# Patient Record
Sex: FEMALE | Race: WHITE | HISPANIC OR LATINO | Employment: PART TIME | URBAN - METROPOLITAN AREA
[De-identification: names, ages, dates, MRNs, and addresses within clinical notes are randomized per-mention and may not be internally consistent; named-entity substitution may affect disease eponyms.]

---

## 2022-07-26 ENCOUNTER — APPOINTMENT (OUTPATIENT)
Dept: OBGYN | Facility: CLINIC | Age: 22
End: 2022-07-26
Payer: MEDICAID

## 2022-07-26 ENCOUNTER — GYNECOLOGY VISIT (OUTPATIENT)
Dept: OBGYN | Facility: CLINIC | Age: 22
End: 2022-07-26
Payer: MEDICAID

## 2022-07-26 VITALS — WEIGHT: 142.9 LBS

## 2022-07-26 DIAGNOSIS — N93.8 DUB (DYSFUNCTIONAL UTERINE BLEEDING): ICD-10-CM

## 2022-07-26 PROCEDURE — 76830 TRANSVAGINAL US NON-OB: CPT | Performed by: OBSTETRICS & GYNECOLOGY

## 2022-07-26 PROCEDURE — 99203 OFFICE O/P NEW LOW 30 MIN: CPT | Mod: 25 | Performed by: OBSTETRICS & GYNECOLOGY

## 2022-07-26 RX ORDER — ONDANSETRON 4 MG/1
4 TABLET, ORALLY DISINTEGRATING ORAL EVERY 4 HOURS PRN
Qty: 20 TABLET | Refills: 0 | Status: SHIPPED | OUTPATIENT
Start: 2022-07-26 | End: 2022-08-31

## 2022-07-26 NOTE — PROGRESS NOTES
Cc: Confirmation of pregnancy    HPI:  The patient is a 21 y.o.  here for confirmation of pregnancy exam.   Patient states that she is nauseous and vomiting at least once per day  She is relatively uncertain about her last period being either May or .    She does state that she gets a menses every month    Fetal movement denies   Vaginal bleeding denies    Leakage of fluid denies    Cramping reports mild  Nausea/vomiting: reports   HA  denies   Dysuria  denies     Review of systems:  Pertinent positives documented in HPI and all other systems reviewed & are negative    Gyn History:   Menarche: 11yo  LMP: ? Or May  Cycles every month,.   Sexually active with male partner, Lifetime partners 4  Birth control history: condoms.  STD hx: denies   Last pap smear: none.    Pregnancy related complications:    OB History    Para Term  AB Living   1             SAB IAB Ectopic Molar Multiple Live Births                    # Outcome Date GA Lbr Femi/2nd Weight Sex Delivery Anes PTL Lv   1 Current              History reviewed. No pertinent past medical history.  History reviewed. No pertinent surgical history.  Social History     Socioeconomic History   • Marital status: Single     Spouse name: Not on file   • Number of children: Not on file   • Years of education: Not on file   • Highest education level: Not on file   Occupational History   • Not on file   Tobacco Use   • Smoking status: Never Smoker   • Smokeless tobacco: Never Used   Vaping Use   • Vaping Use: Never used   Substance and Sexual Activity   • Alcohol use: Not Currently   • Drug use: Never   • Sexual activity: Yes     Partners: Male   Other Topics Concern   • Not on file   Social History Narrative   • Not on file     Social Determinants of Health     Financial Resource Strain: Not on file   Food Insecurity: Not on file   Transportation Needs: Not on file   Physical Activity: Not on file   Stress: Not on file   Social Connections: Not on  file   Intimate Partner Violence: Not on file   Housing Stability: Not on file     Family History   Problem Relation Age of Onset   • No Known Problems Mother    • No Known Problems Father      Allergies:   Allergies as of 2022   • (No Known Allergies)       PE:    Wt 64.8 kg (142 lb 14.4 oz)     General:appears stated age, is in no apparent distress  Head: normocephalic, non-tender  Neck: neck is supple, no jugular venous distension  Abdomen: Bowel sounds positive, nondistended, soft, nontender x4, no rebound or guarding. No organomegaly. No masses.  Female GYN: normal female external genitalia without lesions     Skin: No rashes, or ulcers or lesions seen  Psychiatric: Patient shows appropriate affect, is alert and oriented x3, intact judgment and insight.    transvaginal scan and per my read:    Indication: missed menses, positive pregnancy test.   LMP unsure    Findings: hsu intrauterine pregnancy @6.21, 6.05, 6.4 cm with an average of 6.22 cm.  Average gestational age 12 weeks 4 days.  Positive yolk sac. Positive fetal cardiac activity @165 BPM. Right ovary no masses. Left Ovary small simple cyst. Cervical length 4.03 cm on transvaginal scanning.   Trace free fluid in the cul-de-sac.    Impression: viable IUP @12 weeks 4 days weeks. EDC by US of 2/3/2023    DATIN/3/2023 by today's ultrasound as LMP is unknown as per ACOG guidelines.    A/P:   1. DUB (dysfunctional uterine bleeding)  POCT Pregnancy       # Newly diagnosed pregnancy  Advised on small frequent meals for the prevention of nausea during pregnancy.  Advised that unpasteurized cheeses, raw seafood, and NSAIDs are not recommended during pregnancy.  SAB precautions discussed  Increase water intake and encouraged healthy nutrition.  Begin prenatal vitamins.  Zofran sent to the pharmacy for nausea vomiting    Spent 30 minutes in face-to-face patient contact in which greater than 50% of that visit was spent in counseling/coordination of  care of newly diagnosed pregnancy including medical and surgical options of care.    F/u in 4 weeks for new OB visit

## 2022-07-26 NOTE — PROGRESS NOTES
Patient here for GYN/DUB.  UPT= positive   LMP= unsure   SAM= unsure  GA= unsure   Last pap due for one. Patient has never had one  Phone number: [phone]  Pharmacy verified  C/o patient states vomiting, back pain, headache and some cramping.

## 2022-07-27 ENCOUNTER — OFFICE VISIT (OUTPATIENT)
Dept: OBGYN | Facility: CLINIC | Age: 22
End: 2022-07-27
Payer: MEDICAID

## 2022-08-29 PROBLEM — Z34.00 PREGNANCY, SUPERVISION OF FIRST: Status: ACTIVE | Noted: 2022-08-29

## 2022-08-31 ENCOUNTER — HOSPITAL ENCOUNTER (OUTPATIENT)
Facility: MEDICAL CENTER | Age: 22
End: 2022-08-31
Attending: NURSE PRACTITIONER
Payer: COMMERCIAL

## 2022-08-31 ENCOUNTER — INITIAL PRENATAL (OUTPATIENT)
Dept: OBGYN | Facility: CLINIC | Age: 22
End: 2022-08-31
Payer: COMMERCIAL

## 2022-08-31 VITALS
DIASTOLIC BLOOD PRESSURE: 60 MMHG | SYSTOLIC BLOOD PRESSURE: 112 MMHG | BODY MASS INDEX: 25.76 KG/M2 | HEIGHT: 62 IN | WEIGHT: 140 LBS

## 2022-08-31 DIAGNOSIS — Z34.00 ENCOUNTER FOR SUPERVISION PREGNANCY IN PRIMIGRAVIDA, ANTEPARTUM: ICD-10-CM

## 2022-08-31 PROCEDURE — 88175 CYTOPATH C/V AUTO FLUID REDO: CPT

## 2022-08-31 PROCEDURE — 87591 N.GONORRHOEAE DNA AMP PROB: CPT

## 2022-08-31 PROCEDURE — 0500F INITIAL PRENATAL CARE VISIT: CPT | Performed by: NURSE PRACTITIONER

## 2022-08-31 PROCEDURE — 87491 CHLMYD TRACH DNA AMP PROBE: CPT

## 2022-08-31 ASSESSMENT — EDINBURGH POSTNATAL DEPRESSION SCALE (EPDS)
THINGS HAVE BEEN GETTING ON TOP OF ME: YES, SOMETIMES I HAVEN'T BEEN COPING AS WELL AS USUAL
I HAVE LOOKED FORWARD WITH ENJOYMENT TO THINGS: AS MUCH AS I EVER DID
I HAVE BEEN ANXIOUS OR WORRIED FOR NO GOOD REASON: NO, NOT AT ALL
I HAVE BEEN ABLE TO LAUGH AND SEE THE FUNNY SIDE OF THINGS: AS MUCH AS I ALWAYS COULD
THE THOUGHT OF HARMING MYSELF HAS OCCURRED TO ME: NEVER
TOTAL SCORE: 3
I HAVE BEEN SO UNHAPPY THAT I HAVE BEEN CRYING: NO, NEVER
I HAVE FELT SCARED OR PANICKY FOR NO GOOD REASON: NO, NOT AT ALL
I HAVE FELT SAD OR MISERABLE: NO, NOT AT ALL
I HAVE BLAMED MYSELF UNNECESSARILY WHEN THINGS WENT WRONG: NO, NEVER
I HAVE BEEN SO UNHAPPY THAT I HAVE HAD DIFFICULTY SLEEPING: NOT VERY OFTEN

## 2022-08-31 NOTE — PROGRESS NOTES
Subjective:   Miriam Reyes Lopez is a 22 y.o.  who presents for her new OB exam.  She is 17w5d with an SAM of Estimated Date of Delivery: 2/3/23 by US due to unknown LMP. She is feeling well and has no concerns at this time. Denies VB, LOF, contractions or pain. No ER visits or previous care in this pregnancy. Denies dysuria, vaginal DC, fever. Reports  fetal movement. Desires AFP.  Declines CF.  Desires NIPT testing.     History reviewed. No pertinent past medical history.    Psych Hx: Patient denies any history of depression, anxiety, PTSD, bipolar or any other psychological issues.     History reviewed. No pertinent surgical history.     OB History    Para Term  AB Living   1             SAB IAB Ectopic Molar Multiple Live Births                    # Outcome Date GA Lbr Femi/2nd Weight Sex Delivery Anes PTL Lv   1 Current                 Gynecological Hx: Denies any hx of STIs, including HSV. Denies any vulvovaginal disorders and no hx of abnormal cervical cytology. Last pap n/a.     Sexual Hx: No current partner, who is not involved FOB     Contraceptive Hx: Has not used in the past and has since discontinued use.     Family History   Problem Relation Age of Onset    No Known Problems Mother     No Known Problems Father     No Known Problems Sister     No Known Problems Brother     No Known Problems Brother      Denies any genetic disorders in family history.     Social History     Socioeconomic History    Marital status: Single     Spouse name: Not on file    Number of children: Not on file    Years of education: Not on file    Highest education level: Not on file   Occupational History    Not on file   Tobacco Use    Smoking status: Never    Smokeless tobacco: Never   Vaping Use    Vaping Use: Never used   Substance and Sexual Activity    Alcohol use: Not Currently     Comment: occa    Drug use: Never    Sexual activity: Not Currently     Partners: Male     Birth control/protection: None  "  Other Topics Concern    Not on file   Social History Narrative    Not on file     Social Determinants of Health     Financial Resource Strain: Not on file   Food Insecurity: Not on file   Transportation Needs: Not on file   Physical Activity: Not on file   Stress: Not on file   Social Connections: Not on file   Intimate Partner Violence: Not on file   Housing Stability: Not on file       FOB is not involved and does not live with Miriam Reyes Lopez. She lives with her siblings.  Pregnancy is unplanned but desired.    She is currently not working, denies any heavy lifting or exposure to potential teratogens like environmental or occupational toxins.   Denies alcohol use, drug use, or tobacco use in pregnancy.   Denies any current or hx of sexual, emotional or physical abuse or trauma.     Current Medications: PNV, n/a  Allergies: Denies allergies to medications, food, or environmental allergies    Objective:      Vitals:    08/31/22 1421   BP: 112/60   Weight: 63.5 kg (140 lb)   Height: 1.575 m (5' 2\")        See Prenatal Physical and Prenatal Vitals    Prenatal Physical:  General Exam:  HEENT: normal    Heart: normal    Thyroid: normal    Lungs: normal    Lymph Nodes: normal    Breasts: normal    Neurological: normal    Abdomen: normal    Skin: normal    Extremities: normal    Pelvic Exam:  Vulva:  Normal  Vagina:  Normal  Cervix:  Inflammation  Uterus (wks):  17  Adnexa:  Normal       Assessment:      1.  IUP @ 17w5d per US      2.  S=D      3.  See problem list as follows       Patient Active Problem List    Diagnosis Date Noted    Supervision of first pregnancy  08/29/2022         Plan:   - GC/CT & pap done today  - NIPT and AFP ordered   - Prenatal labs ordered - lab slip given  - Discussed PNV, nutrition, adequate water intake, and exercise/weight gain in pregnancy  - NOB informational packet with anticipatory guidance given  - Information on Centering Pregnancy given, n/a  - S/sx of pregnancy warning signs " and PTL precautions given  - Complete OB US in 3 wks  - Return to [facility] in 4 wks

## 2022-08-31 NOTE — PROGRESS NOTES
Pt here for New OB today  LMP: unknown   Phone: [phone]  Pharmacy verified  Pt is taking PNV  Pt c/o mild N/V, no medication required   Declines genetic testing

## 2022-09-01 ENCOUNTER — HOSPITAL ENCOUNTER (OUTPATIENT)
Dept: LAB | Facility: MEDICAL CENTER | Age: 22
End: 2022-09-01
Attending: NURSE PRACTITIONER
Payer: COMMERCIAL

## 2022-09-01 DIAGNOSIS — Z34.00 ENCOUNTER FOR SUPERVISION PREGNANCY IN PRIMIGRAVIDA, ANTEPARTUM: ICD-10-CM

## 2022-09-01 PROCEDURE — 36415 COLL VENOUS BLD VENIPUNCTURE: CPT

## 2022-09-01 PROCEDURE — 99001 SPECIMEN HANDLING PT-LAB: CPT

## 2022-09-05 LAB
C TRACH DNA GENITAL QL NAA+PROBE: POSITIVE
CYTOLOGY REG CYTOL: ABNORMAL
N GONORRHOEA DNA GENITAL QL NAA+PROBE: NEGATIVE
SPECIMEN SOURCE: ABNORMAL

## 2022-09-06 DIAGNOSIS — O98.812 CHLAMYDIA INFECTION AFFECTING PREGNANCY IN SECOND TRIMESTER: ICD-10-CM

## 2022-09-06 DIAGNOSIS — A74.9 CHLAMYDIA INFECTION AFFECTING PREGNANCY IN SECOND TRIMESTER: ICD-10-CM

## 2022-09-06 PROBLEM — O98.819 CHLAMYDIA INFECTION AFFECTING PREGNANCY: Status: ACTIVE | Noted: 2022-09-06

## 2022-09-06 RX ORDER — AZITHROMYCIN 500 MG/1
500 TABLET, FILM COATED ORAL ONCE
Qty: 2 TABLET | Refills: 0 | Status: SHIPPED | OUTPATIENT
Start: 2022-09-06 | End: 2022-09-06

## 2022-09-07 ENCOUNTER — TELEPHONE (OUTPATIENT)
Dept: OBGYN | Facility: CLINIC | Age: 22
End: 2022-09-07
Payer: COMMERCIAL

## 2022-09-07 NOTE — TELEPHONE ENCOUNTER
----- Message from HOLLEY Ledesma sent at 2022  7:15 PM PDT -----  Pt has chlamydia. Rx sent in, can send in partner treatment and all partners need treatment from the past three months. No sex for one week after treatment and we will retest her so please treva date of treatment. Also check and see if she has BV symptoms.     Pt notified regarding positive chlamydia and need to  Rx from her pharmacy, explained to pt she has to take the 2 tablets at once and is only one time dose.  Advised pt make a note of the date she took medication because she needs to be retested 4 wks after she had taken meds. Advised for her partner or any partners in the past 3 months need to be treated with his PCP or [facility] or provider can send a Rx for current partner. Pt states she does want provider to send Rx for current partner but pt does not know his . Pt to call this RN back tomorrow to provide information. Pt verbalized understanding.   [facility] form and lab results faxed to [facility].     In regards about BV, pt states she does have vaginal pain, yellow/greenish d/c and foul odor. Explained provider will be notify tomorrow and Rx will be send to her pharmacy. Pt verbalized understanding.     22  0824 pt called back and provided her partner name Emre Triplett  1992. Pt states beside the symptoms mentioned yesterday she also has itching. Consulted with Krystal ALBRIGHT and advised for pt to finish antibiotics first for chlamydia and BV; which will send Rx for, and if pt continue to have itching, pt to use OTC Monistat 7 x7 nights. Pt informed as above and explained BV is not an STI. Pt informed she needs to start antibiotics. Should take the full Tx and advised to take meds with food but not with milk and to avoid alcohol and intercourse while on Tx. Pharmacy verified with pt. Advised pt to take Chlamydia Tx first today and than tomorrow to start Tx for BV. Pt agreed and verbalized  understanding.

## 2022-09-08 DIAGNOSIS — B96.89 BACTERIAL VAGINOSIS: ICD-10-CM

## 2022-09-08 DIAGNOSIS — N76.0 BACTERIAL VAGINOSIS: ICD-10-CM

## 2022-09-08 RX ORDER — METRONIDAZOLE 500 MG/1
500 TABLET ORAL 3 TIMES DAILY
Qty: 14 TABLET | Refills: 0 | Status: SHIPPED | OUTPATIENT
Start: 2022-09-08 | End: 2022-09-14

## 2022-09-08 NOTE — TELEPHONE ENCOUNTER
EPT called in for Emre Triplett [birth year] for chlamydia to [facility] [address].

## 2022-09-09 ENCOUNTER — TELEPHONE (OUTPATIENT)
Dept: OBGYN | Facility: CLINIC | Age: 22
End: 2022-09-09
Payer: COMMERCIAL

## 2022-09-09 NOTE — TELEPHONE ENCOUNTER
Pt called stating she went to her pharmacy yesterday to  her azithromycin Rx and she was told they didn't have it. Called [facility] pharmacy on [address] and pharmacy tech stated they do have it for pt but they haveit under the wrong  of 1998, tech stated pt need to give them this  and then fix it. Pt notified, pt agreed and verbalized understanding.    Pt states she was able to get her Flygyl and instruction states to take 1 tab 3 times a day. Consulted with Krystal ALBRIGHT and there was a typo and pt should only take 1 tab 2 times a day for 7 days. Pt notified and verbalized understanding.

## 2022-09-12 ENCOUNTER — GYNECOLOGY VISIT (OUTPATIENT)
Dept: OBGYN | Facility: CLINIC | Age: 22
End: 2022-09-12
Payer: COMMERCIAL

## 2022-09-12 ENCOUNTER — HOSPITAL ENCOUNTER (OUTPATIENT)
Facility: MEDICAL CENTER | Age: 22
End: 2022-09-12
Attending: OBSTETRICS & GYNECOLOGY
Payer: COMMERCIAL

## 2022-09-12 ENCOUNTER — TELEPHONE (OUTPATIENT)
Dept: OBGYN | Facility: CLINIC | Age: 22
End: 2022-09-12
Payer: COMMERCIAL

## 2022-09-12 VITALS
BODY MASS INDEX: 25.34 KG/M2 | DIASTOLIC BLOOD PRESSURE: 72 MMHG | SYSTOLIC BLOOD PRESSURE: 122 MMHG | WEIGHT: 143 LBS | HEIGHT: 63 IN

## 2022-09-12 DIAGNOSIS — R30.0 DYSURIA: ICD-10-CM

## 2022-09-12 DIAGNOSIS — B37.31 VULVOVAGINITIS DUE TO YEAST: ICD-10-CM

## 2022-09-12 LAB
APPEARANCE UR: CLEAR
BILIRUB UR STRIP-MCNC: NORMAL MG/DL
COLOR UR AUTO: YELLOW
GLUCOSE UR STRIP.AUTO-MCNC: NORMAL MG/DL
KETONES UR STRIP.AUTO-MCNC: NORMAL MG/DL
LEUKOCYTE ESTERASE UR QL STRIP.AUTO: NORMAL
NITRITE UR QL STRIP.AUTO: NORMAL
PH UR STRIP.AUTO: 6 [PH] (ref 5–8)
PROT UR QL STRIP: NORMAL MG/DL
RBC UR QL AUTO: NORMAL
SP GR UR STRIP.AUTO: 1.02
UROBILINOGEN UR STRIP-MCNC: NORMAL MG/DL

## 2022-09-12 PROCEDURE — 99213 OFFICE O/P EST LOW 20 MIN: CPT | Performed by: OBSTETRICS & GYNECOLOGY

## 2022-09-12 PROCEDURE — 87480 CANDIDA DNA DIR PROBE: CPT

## 2022-09-12 PROCEDURE — 81002 URINALYSIS NONAUTO W/O SCOPE: CPT | Performed by: OBSTETRICS & GYNECOLOGY

## 2022-09-12 PROCEDURE — 87510 GARDNER VAG DNA DIR PROBE: CPT

## 2022-09-12 PROCEDURE — 87660 TRICHOMONAS VAGIN DIR PROBE: CPT

## 2022-09-12 ASSESSMENT — ENCOUNTER SYMPTOMS
EYES NEGATIVE: 1
CONSTITUTIONAL NEGATIVE: 1
RESPIRATORY NEGATIVE: 1
CARDIOVASCULAR NEGATIVE: 1
GASTROINTESTINAL NEGATIVE: 1

## 2022-09-12 NOTE — TELEPHONE ENCOUNTER
09/12/22  2:02 PM  Patient called to inform us that she is having vaginal burning She took the azithromycin. She took two days of the flagyl. Patient complains of chills. Scheduled for visit today with Dr. estrada

## 2022-09-12 NOTE — PROGRESS NOTES
"Subjective     Miriam Reyes Lopez is a 22 y.o. female who presents with Gynecologic Exam (Vaginal pain, Dysuria)  Pt recently was treated for chlamydia and BV. She notes burning when urinating. Also feels \"open\" in vag area.              Review of Systems   Constitutional: Negative.    HENT: Negative.     Eyes: Negative.    Respiratory: Negative.     Cardiovascular: Negative.    Gastrointestinal: Negative.    Genitourinary:  Positive for dysuria.   Skin: Negative.             Objective     /72 (BP Location: Right arm)   Ht 1.6 m (5' 3\")   Wt 64.9 kg (143 lb)   LMP  (LMP Unknown)   BMI 25.33 kg/m²      Physical Exam  Constitutional:       Appearance: Normal appearance.   Abdominal:      General: There is distension.      Palpations: Abdomen is soft.      Comments: Gravid to just above umbilicus= no distention, just gravid   Genitourinary:     General: Normal vulva.      Comments: Vulva and labial majora nml however pink, red irritation noted medial to labia minora consistant with atypical yeast.  Speculum shows cvx closed, slight clear dischage, sample taken since recent BV and c/o external burning with voiding                          Assessment & Plan        1. Dysuria  Neg UA  - POCT Urinalysis    2. Vulvovaginitis due to yeast  Rx sent  - terconazole (TERAZOL 3) 0.8 % vaginal cream; Use sparingly externally bid as directed in area affected for up to 1 week if needed  Dispense: 30 g; Refill: 0  - VAGINAL PATHOGENS DNA PANEL  3) incidental preg- keep already scheduled appts, no complaints. Prec given                "

## 2022-09-13 LAB — AMBIGUOUS DTTM AMBI4: NORMAL

## 2022-09-14 ENCOUNTER — TELEPHONE (OUTPATIENT)
Dept: OBGYN | Facility: CLINIC | Age: 22
End: 2022-09-14
Payer: COMMERCIAL

## 2022-09-14 LAB
CANDIDA DNA VAG QL PROBE+SIG AMP: NEGATIVE
G VAGINALIS DNA VAG QL PROBE+SIG AMP: POSITIVE
T VAGINALIS DNA VAG QL PROBE+SIG AMP: NEGATIVE

## 2022-09-14 NOTE — TELEPHONE ENCOUNTER
Pt called stating yesterday she was seen provider gave her a Rx for a cream for yeast and her partner is having itching and bad odor. Pt asking if we can also send him treatment for this as well. Consulted with Dr. Fatima who saw pt yesterday and advised for partner to OTC Monistat cream and apply it to affected area, if pt's partner symptoms do not improve , partner needs to be seen by his provider. Pt informed as above and agreed and verbalized understanding.

## 2022-09-21 ENCOUNTER — APPOINTMENT (OUTPATIENT)
Dept: RADIOLOGY | Facility: IMAGING CENTER | Age: 22
End: 2022-09-21
Attending: NURSE PRACTITIONER
Payer: COMMERCIAL

## 2022-09-21 ENCOUNTER — TELEPHONE (OUTPATIENT)
Dept: OBGYN | Facility: CLINIC | Age: 22
End: 2022-09-21

## 2022-09-21 DIAGNOSIS — Z34.00 ENCOUNTER FOR SUPERVISION PREGNANCY IN PRIMIGRAVIDA, ANTEPARTUM: ICD-10-CM

## 2022-09-21 PROCEDURE — 76805 OB US >/= 14 WKS SNGL FETUS: CPT | Mod: TC | Performed by: RADIOLOGY

## 2022-09-22 NOTE — TELEPHONE ENCOUNTER
Pt called stating she had her US today and the provider who did her US did not speak a lot of Welsh and she was not told her baby's gender, pt asking for NIPT results as well. Explained to pt the reason we do the US is to identify anatomical abnormalities and if they are able to see the baby's gender the radiologist will note on the report. Provider reviewed US results today. Explained to pt on US results it states likely male. NIPT results obtained from [facility] portal and send to provider to review and base on those results baby is male. Pt informed. Pt asking if she will have another US. Explained to base on US results, US is normal and US will only be repeated if medical indicated. Pt verbalized understanding.

## 2022-09-23 ENCOUNTER — TELEPHONE (OUTPATIENT)
Dept: OBGYN | Facility: CLINIC | Age: 22
End: 2022-09-23
Payer: COMMERCIAL

## 2022-09-23 RX ORDER — METRONIDAZOLE 500 MG/1
500 TABLET ORAL 2 TIMES DAILY
Qty: 14 TABLET | Refills: 0 | Status: SHIPPED | OUTPATIENT
Start: 2022-09-23 | End: 2022-09-30

## 2022-09-23 NOTE — TELEPHONE ENCOUNTER
Midwife Pat Upton reviewed vaginal pathogens results and recommends treatment with Flagyl, Rx sent. Spoke with pt and informed her of +BV and RX sent, instructions given. Pt understood and had no questions or concerns

## 2022-09-28 ENCOUNTER — HOSPITAL ENCOUNTER (OUTPATIENT)
Facility: MEDICAL CENTER | Age: 22
End: 2022-09-28
Attending: PHYSICIAN ASSISTANT
Payer: COMMERCIAL

## 2022-09-28 ENCOUNTER — ROUTINE PRENATAL (OUTPATIENT)
Dept: OBGYN | Facility: CLINIC | Age: 22
End: 2022-09-28
Payer: COMMERCIAL

## 2022-09-28 VITALS — DIASTOLIC BLOOD PRESSURE: 66 MMHG | WEIGHT: 143 LBS | SYSTOLIC BLOOD PRESSURE: 116 MMHG | BODY MASS INDEX: 25.33 KG/M2

## 2022-09-28 DIAGNOSIS — Z34.02 ENCOUNTER FOR PRENATAL CARE IN SECOND TRIMESTER OF FIRST PREGNANCY: ICD-10-CM

## 2022-09-28 PROCEDURE — 87591 N.GONORRHOEAE DNA AMP PROB: CPT

## 2022-09-28 PROCEDURE — 90471 IMMUNIZATION ADMIN: CPT | Performed by: PHYSICIAN ASSISTANT

## 2022-09-28 PROCEDURE — 90040 PR PRENATAL FOLLOW UP: CPT | Performed by: PHYSICIAN ASSISTANT

## 2022-09-28 PROCEDURE — 87491 CHLMYD TRACH DNA AMP PROBE: CPT

## 2022-09-28 PROCEDURE — 90686 IIV4 VACC NO PRSV 0.5 ML IM: CPT | Performed by: PHYSICIAN ASSISTANT

## 2022-09-28 NOTE — PROGRESS NOTES
Pt has no complaints with cramping, bleeding or pain. No discernable FM yet but +FM noted on Doppler and pt acknowledges she feels the movement then. Pt has not done labs yet - will do PNL, MSAFP this week, as she was unaware. US wnl - pt notified of results and NIPT results. 1hr GTT next visit. Pt also will take Flagyl this week, so improving with vag irritation. Flu vaccine given today. RTC 4 wk or sooner prn.

## 2022-09-28 NOTE — PROGRESS NOTES
Pt. Here for OB/FU. Reports Good FM.   Pt states no complaints.   Pt states was not aware needed to PNP and AFP lab slips reprinted.   Pt states took medication for + Chlamydia on 9/8/2022 and FOB took also on same day. PREETHI today   Flu vaccine given today right deltoid

## 2022-09-29 LAB
C TRACH DNA GENITAL QL NAA+PROBE: NEGATIVE
N GONORRHOEA DNA GENITAL QL NAA+PROBE: NEGATIVE
SPECIMEN SOURCE: NORMAL

## 2022-10-04 ENCOUNTER — HOSPITAL ENCOUNTER (OUTPATIENT)
Dept: LAB | Facility: MEDICAL CENTER | Age: 22
End: 2022-10-04
Attending: NURSE PRACTITIONER
Payer: COMMERCIAL

## 2022-10-04 DIAGNOSIS — Z34.00 ENCOUNTER FOR SUPERVISION PREGNANCY IN PRIMIGRAVIDA, ANTEPARTUM: ICD-10-CM

## 2022-10-04 PROBLEM — O99.019 ANEMIA AFFECTING PREGNANCY: Status: ACTIVE | Noted: 2022-10-04

## 2022-10-04 LAB
ABO GROUP BLD: NORMAL
AMORPH CRY #/AREA URNS HPF: PRESENT /HPF
ANISOCYTOSIS BLD QL SMEAR: ABNORMAL
APPEARANCE UR: ABNORMAL
BACTERIA #/AREA URNS HPF: ABNORMAL /HPF
BASOPHILS # BLD AUTO: 0.8 % (ref 0–1.8)
BASOPHILS # BLD: 0.07 K/UL (ref 0–0.12)
BILIRUB UR QL STRIP.AUTO: NEGATIVE
BLD GP AB SCN SERPL QL: NORMAL
BURR CELLS BLD QL SMEAR: NORMAL
COLOR UR: YELLOW
EOSINOPHIL # BLD AUTO: 0.3 K/UL (ref 0–0.51)
EOSINOPHIL NFR BLD: 3.5 % (ref 0–6.9)
EPI CELLS #/AREA URNS HPF: ABNORMAL /HPF
ERYTHROCYTE [DISTWIDTH] IN BLOOD BY AUTOMATED COUNT: 42 FL (ref 35.9–50)
GLUCOSE UR STRIP.AUTO-MCNC: NEGATIVE MG/DL
HBV SURFACE AG SER QL: ABNORMAL
HCT VFR BLD AUTO: 31.9 % (ref 37–47)
HCV AB SER QL: ABNORMAL
HGB BLD-MCNC: 9.7 G/DL (ref 12–16)
HIV 1+2 AB+HIV1 P24 AG SERPL QL IA: NORMAL
HYALINE CASTS #/AREA URNS LPF: ABNORMAL /LPF
KETONES UR STRIP.AUTO-MCNC: NEGATIVE MG/DL
LEUKOCYTE ESTERASE UR QL STRIP.AUTO: NEGATIVE
LYMPHOCYTES # BLD AUTO: 1.26 K/UL (ref 1–4.8)
LYMPHOCYTES NFR BLD: 14.8 % (ref 22–41)
MANUAL DIFF BLD: NORMAL
MCH RBC QN AUTO: 22.4 PG (ref 27–33)
MCHC RBC AUTO-ENTMCNC: 30.4 G/DL (ref 33.6–35)
MCV RBC AUTO: 73.7 FL (ref 81.4–97.8)
MICRO URNS: ABNORMAL
MICROCYTES BLD QL SMEAR: ABNORMAL
MONOCYTES # BLD AUTO: 0.3 K/UL (ref 0–0.85)
MONOCYTES NFR BLD AUTO: 3.5 % (ref 0–13.4)
MORPHOLOGY BLD-IMP: NORMAL
NEUTROPHILS # BLD AUTO: 6.58 K/UL (ref 2–7.15)
NEUTROPHILS NFR BLD: 77.4 % (ref 44–72)
NITRITE UR QL STRIP.AUTO: NEGATIVE
NRBC # BLD AUTO: 0 K/UL
NRBC BLD-RTO: 0 /100 WBC
OVALOCYTES BLD QL SMEAR: NORMAL
PH UR STRIP.AUTO: 7.5 [PH] (ref 5–8)
PLATELET # BLD AUTO: 361 K/UL (ref 164–446)
PLATELET BLD QL SMEAR: NORMAL
PMV BLD AUTO: 9.4 FL (ref 9–12.9)
POIKILOCYTOSIS BLD QL SMEAR: NORMAL
PROT UR QL STRIP: NEGATIVE MG/DL
RBC # BLD AUTO: 4.33 M/UL (ref 4.2–5.4)
RBC # URNS HPF: ABNORMAL /HPF
RBC BLD AUTO: PRESENT
RBC UR QL AUTO: NEGATIVE
RH BLD: NORMAL
RUBV AB SER QL: 92.3 IU/ML
SP GR UR STRIP.AUTO: 1.02
T PALLIDUM AB SER QL IA: ABNORMAL
UROBILINOGEN UR STRIP.AUTO-MCNC: 0.2 MG/DL
WBC # BLD AUTO: 8.5 K/UL (ref 4.8–10.8)
WBC #/AREA URNS HPF: ABNORMAL /HPF

## 2022-10-04 PROCEDURE — 86850 RBC ANTIBODY SCREEN: CPT

## 2022-10-04 PROCEDURE — 87389 HIV-1 AG W/HIV-1&-2 AB AG IA: CPT

## 2022-10-04 PROCEDURE — 85025 COMPLETE CBC W/AUTO DIFF WBC: CPT

## 2022-10-04 PROCEDURE — 86762 RUBELLA ANTIBODY: CPT

## 2022-10-04 PROCEDURE — 81001 URINALYSIS AUTO W/SCOPE: CPT

## 2022-10-04 PROCEDURE — 85007 BL SMEAR W/DIFF WBC COUNT: CPT

## 2022-10-04 PROCEDURE — 86901 BLOOD TYPING SEROLOGIC RH(D): CPT

## 2022-10-04 PROCEDURE — 82105 ALPHA-FETOPROTEIN SERUM: CPT

## 2022-10-04 PROCEDURE — 80307 DRUG TEST PRSMV CHEM ANLYZR: CPT

## 2022-10-04 PROCEDURE — 86780 TREPONEMA PALLIDUM: CPT

## 2022-10-04 PROCEDURE — 87340 HEPATITIS B SURFACE AG IA: CPT

## 2022-10-04 PROCEDURE — 86900 BLOOD TYPING SEROLOGIC ABO: CPT

## 2022-10-04 PROCEDURE — 86592 SYPHILIS TEST NON-TREP QUAL: CPT

## 2022-10-04 PROCEDURE — 36415 COLL VENOUS BLD VENIPUNCTURE: CPT

## 2022-10-04 PROCEDURE — 87086 URINE CULTURE/COLONY COUNT: CPT

## 2022-10-04 PROCEDURE — 86803 HEPATITIS C AB TEST: CPT

## 2022-10-05 ENCOUNTER — TELEPHONE (OUTPATIENT)
Dept: OBGYN | Facility: CLINIC | Age: 22
End: 2022-10-05
Payer: COMMERCIAL

## 2022-10-05 LAB
AMPHET CTO UR CFM-MCNC: NEGATIVE NG/ML
BARBITURATES CTO UR CFM-MCNC: NEGATIVE NG/ML
BENZODIAZ CTO UR CFM-MCNC: NEGATIVE NG/ML
CANNABINOIDS CTO UR CFM-MCNC: NEGATIVE NG/ML
COCAINE CTO UR CFM-MCNC: NEGATIVE NG/ML
DRUG COMMENT 753798: NORMAL
METHADONE CTO UR CFM-MCNC: NEGATIVE NG/ML
OPIATES CTO UR CFM-MCNC: NEGATIVE NG/ML
PCP CTO UR CFM-MCNC: NEGATIVE NG/ML
PROPOXYPH CTO UR CFM-MCNC: NEGATIVE NG/ML

## 2022-10-05 NOTE — TELEPHONE ENCOUNTER
----- Message from HOLLEY Ledesma sent at 10/4/2022  1:40 PM PDT -----  Let pt know to take iron 2-3 times a day and increase her iron rich foods please.     10/05/22  11:11 AM   service used, ID #[id number]  Left message for pt to call back regarding test results.     10/06/22  9:37 AM   service used, ID #[id number]  Left message for pt to call back regarding lab results.     Patient informed. Please see telephone encounter from Deanna LEON

## 2022-10-06 LAB
BACTERIA UR CULT: NORMAL
SIGNIFICANT IND 70042: NORMAL
SITE SITE: NORMAL
SOURCE SOURCE: NORMAL

## 2022-10-07 ENCOUNTER — TELEPHONE (OUTPATIENT)
Dept: OBGYN | Facility: CLINIC | Age: 22
End: 2022-10-07
Payer: COMMERCIAL

## 2022-10-07 NOTE — TELEPHONE ENCOUNTER
----- Message from HOLLEY Ledesma sent at 10/4/2022  1:40 PM PDT -----  Let pt know to take iron 2-3 times a day and increase her iron rich foods please.     3rd attempt  10/07/22  1431 Left message for pt to call back regarding lab results.   1701 Pt notified of need to start on Iron supplementation to take 325 mg 2-3 times a day. Recommended to take it with orange juice/vit c, to avoid dairy products 1hr before and 2hr after. Not to take with PNV. To increase water intake to decrease side effects of constipation. Advised to increase iron rich foods. Pt agreed and verbalized understanding.

## 2022-10-09 LAB
# FETUSES US: NORMAL
AFP MOM SERPL: 1.31
AFP SERPL-MCNC: 124 NG/ML
AGE - REPORTED: 24.5 YR
CURRENT SMOKER: NO
FAMILY MEMBER DISEASES HX: NO
GA METHOD: NORMAL
GA: NORMAL WK
IDDM PATIENT QL: NO
INTEGRATED SCN PATIENT-IMP: NORMAL
SPECIMEN DRAWN SERPL: NORMAL

## 2022-10-25 ENCOUNTER — ROUTINE PRENATAL (OUTPATIENT)
Dept: OBGYN | Facility: CLINIC | Age: 22
End: 2022-10-25
Payer: COMMERCIAL

## 2022-10-25 VITALS — DIASTOLIC BLOOD PRESSURE: 62 MMHG | WEIGHT: 143.8 LBS | SYSTOLIC BLOOD PRESSURE: 108 MMHG | BODY MASS INDEX: 25.47 KG/M2

## 2022-10-25 DIAGNOSIS — Z34.02 ENCOUNTER FOR PRENATAL CARE IN SECOND TRIMESTER OF FIRST PREGNANCY: ICD-10-CM

## 2022-10-25 PROBLEM — B96.89 BACTERIAL VAGINOSIS: Status: RESOLVED | Noted: 2022-09-08 | Resolved: 2022-10-25

## 2022-10-25 PROBLEM — O98.819 CHLAMYDIA INFECTION AFFECTING PREGNANCY: Status: RESOLVED | Noted: 2022-09-06 | Resolved: 2022-10-25

## 2022-10-25 PROBLEM — A74.9 CHLAMYDIA INFECTION AFFECTING PREGNANCY: Status: RESOLVED | Noted: 2022-09-06 | Resolved: 2022-10-25

## 2022-10-25 PROBLEM — N76.0 BACTERIAL VAGINOSIS: Status: RESOLVED | Noted: 2022-09-08 | Resolved: 2022-10-25

## 2022-10-25 PROCEDURE — 0502F SUBSEQUENT PRENATAL CARE: CPT | Performed by: PHYSICIAN ASSISTANT

## 2022-10-25 NOTE — PROGRESS NOTES
Pt. Here for OB/FU. Reports Good FM.   Pt states has feeling dizziness.  Pt given 1 HR GTT, RPR and CBC lab slip today along with instructions.

## 2022-10-25 NOTE — PROGRESS NOTES
Pt has no complaints with cramping, bleeding or pain, though pt has occ dizziness and random pains in stomach. Pt is drinking only 2 bottles water daily, so urged to incr to 8-10 daily, call if not improving. +FM but little - pt concerned. I explained pt should pay close attention to movements, though all babies are different and will give FKC next visit. PREETHI for chlamydia infection neg - pt notified. PNL, MSAFP wnl - pt notified and aware of severe anemia. Pt already taking PNV tabs and has added iron supplements. Will recheck with 1hr GTT. Pt also hasnt gained uch weight but states she is eating well, has no N/V - pt to add some calorie dense foods, though continue healthy habits as well, as fundal height c/w dating, US shows adequate growth. RTC 4 wk or sooner prn.

## 2022-11-03 ENCOUNTER — HOSPITAL ENCOUNTER (OUTPATIENT)
Dept: LAB | Facility: MEDICAL CENTER | Age: 22
End: 2022-11-03
Attending: PHYSICIAN ASSISTANT
Payer: COMMERCIAL

## 2022-11-03 DIAGNOSIS — Z34.02 ENCOUNTER FOR PRENATAL CARE IN SECOND TRIMESTER OF FIRST PREGNANCY: ICD-10-CM

## 2022-11-03 LAB
ERYTHROCYTE [DISTWIDTH] IN BLOOD BY AUTOMATED COUNT: 45 FL (ref 35.9–50)
GLUCOSE 1H P 50 G GLC PO SERPL-MCNC: 149 MG/DL (ref 70–139)
HCT VFR BLD AUTO: 32.6 % (ref 37–47)
HGB BLD-MCNC: 9.7 G/DL (ref 12–16)
MCH RBC QN AUTO: 21.8 PG (ref 27–33)
MCHC RBC AUTO-ENTMCNC: 29.8 G/DL (ref 33.6–35)
MCV RBC AUTO: 73.3 FL (ref 81.4–97.8)
PLATELET # BLD AUTO: 347 K/UL (ref 164–446)
PMV BLD AUTO: 9.7 FL (ref 9–12.9)
RBC # BLD AUTO: 4.45 M/UL (ref 4.2–5.4)
T PALLIDUM AB SER QL IA: NORMAL
WBC # BLD AUTO: 7.7 K/UL (ref 4.8–10.8)

## 2022-11-03 PROCEDURE — 85027 COMPLETE CBC AUTOMATED: CPT

## 2022-11-03 PROCEDURE — 82950 GLUCOSE TEST: CPT

## 2022-11-03 PROCEDURE — 86780 TREPONEMA PALLIDUM: CPT

## 2022-11-03 PROCEDURE — 36415 COLL VENOUS BLD VENIPUNCTURE: CPT

## 2022-11-04 ENCOUNTER — TELEPHONE (OUTPATIENT)
Dept: OBGYN | Facility: CLINIC | Age: 22
End: 2022-11-04
Payer: COMMERCIAL

## 2022-11-04 DIAGNOSIS — R73.09 ELEVATED GLUCOSE TOLERANCE TEST: ICD-10-CM

## 2022-11-05 NOTE — TELEPHONE ENCOUNTER
----- Message from BRIANDA Watts sent at 11/4/2022 11:09 AM PDT -----  Needs 3hr GTT asap please. Ordered today.     Also, just confirm pt is taking PNV caps/tabs and not gummies, or at least taking FeSO4 325mf supplements if taking gummies.       Pt notified of abnormal 1hr gtt and need to do 3hr gtt this time. Pt instructed to fast 10-12hr prior to testing. Pt informed she is only allow to drink plain water during fasting time. Advised to bring a snack for after the test is done. Pt notified will be staying in the labs for the 3hr. Pt agreed to do it Tuesday 11/8/2022. Pt verbalized understanding.    Pt states she is taking PNV gels form and also taking iron pill 2 times a day. Explained to pt I will call her back on Monday 11/7/2022 after consulting with provider. Pt agreed and verbalized understanding.     11/07/22  0405 Consulted with Daniella ALBRIGHT and advised for pt to continue taking iron pill 2 times a day and to unsure she is taking it on an empty stomach with OJ/Vit C and to increase her iron rich foods.   1515 called pt and notified as indicated by Daniella ALBRIGHT. Pt agreed and verbalized understanding.

## 2022-11-11 ENCOUNTER — HOSPITAL ENCOUNTER (OUTPATIENT)
Dept: LAB | Facility: MEDICAL CENTER | Age: 22
End: 2022-11-11
Attending: PHYSICIAN ASSISTANT
Payer: COMMERCIAL

## 2022-11-11 DIAGNOSIS — R73.09 ELEVATED GLUCOSE TOLERANCE TEST: ICD-10-CM

## 2022-11-11 LAB
GLUCOSE 1H P CHAL SERPL-MCNC: 154 MG/DL (ref 65–180)
GLUCOSE 2H P CHAL SERPL-MCNC: 137 MG/DL (ref 65–155)
GLUCOSE 3H P CHAL SERPL-MCNC: 133 MG/DL (ref 65–140)
GLUCOSE BS SERPL-MCNC: 83 MG/DL (ref 65–95)

## 2022-11-11 PROCEDURE — 36415 COLL VENOUS BLD VENIPUNCTURE: CPT

## 2022-11-11 PROCEDURE — 82952 GTT-ADDED SAMPLES: CPT

## 2022-11-11 PROCEDURE — 82951 GLUCOSE TOLERANCE TEST (GTT): CPT

## 2022-11-23 ENCOUNTER — ROUTINE PRENATAL (OUTPATIENT)
Dept: OBGYN | Facility: CLINIC | Age: 22
End: 2022-11-23
Payer: COMMERCIAL

## 2022-11-23 VITALS — BODY MASS INDEX: 26.04 KG/M2 | DIASTOLIC BLOOD PRESSURE: 60 MMHG | SYSTOLIC BLOOD PRESSURE: 100 MMHG | WEIGHT: 147 LBS

## 2022-11-23 DIAGNOSIS — Z34.03 ENCOUNTER FOR PRENATAL CARE IN THIRD TRIMESTER OF FIRST PREGNANCY: ICD-10-CM

## 2022-11-23 PROCEDURE — 90715 TDAP VACCINE 7 YRS/> IM: CPT | Performed by: PHYSICIAN ASSISTANT

## 2022-11-23 PROCEDURE — 0502F SUBSEQUENT PRENATAL CARE: CPT | Performed by: PHYSICIAN ASSISTANT

## 2022-11-23 PROCEDURE — 90471 IMMUNIZATION ADMIN: CPT | Performed by: PHYSICIAN ASSISTANT

## 2022-11-23 NOTE — PROGRESS NOTES
Pt has no complaints with cramping, UCs, VB, LOF, though pt has incr pressure and occ low back pain - stretches d/w pt today. +FM. 1hr GTT elevated, but 3hr GTT wnl and CBC stable from prior labs - pt taking daily FeSO4 supplementation and aware of other lab results. TDaP given today. PTL precautions reviewed. RTC 2 wk or sooner prn.

## 2022-11-23 NOTE — LETTER
Cuente los Movimientos de garcia Bebé  Otro paso importante para la lisa de garcia bebé    Miriam Reyes Lopez     [facility]            Dept: [phone]    ¿Cuántas semanas tiene de embarazo? 29w5d    Fecha cuando tiene que comenzar a contar el movimiento: 11/23/2022                  [place] debe usar yvette diagrama    Case manera en que garcia doctor puede controlar a lisa de garcia bebé es sabiendo cuantas veces se mueve garcia bebé en el útero, o por medio de las “pataditas”.  Usted podrá ayudarle a garcia médico al usar cada día el siguiente diagrama.    Cada día, usted debe prestar atención a cuantas horas le lleva a garcia bebé moverse 10 veces.  Comience a contar en la mañana, lo antes posible después de haberse levantado.    · Primeramente, escriba la hora en que se mueve garcia bebé, hasta llegar a 10 veces.  · Colóquele un check o palomita a cada cuadrito cada vez que garcia bebé se mueva hasta que complete 10 veces.  · Escriba la hora cuando termine de contar 10 veces en la última columna.  · Sume el total del tiempo que le llevó contar los 10 movimientos.  · Finalmente, complete el cuadrito de cuantas horas le llevó hacerlo.    Después de brianne contado los 10 movimientos, ya no tendrá que contar los demás movimientos por el tara del día.  A la mañana siguiente, comience a contar de nuevo cuantas veces se mueve el bebé desde el momento en que se levante.    ¿Qué tendría que considerarse un “movimiento”?  Es difícil de decirlo porque es distinto de case madre a otra, y de un embarazo a otro.  Lo importante es que cuente el movimiento de la misma manera orion el transcurso de garcia embarazo.  Si tiene preguntas adicionales, pregúntele a garcia doctor.    ¡Cuente cuidadosamente cada día!     MUESTRA:  Semana 28    ¿Cuántas horas le ha llevado sentir 10 movimientos?        Hora de Inicio     1     2     3     4     5     6     7     8     9     10   Hora de Finlizar   Miguel. 8:20 ·  ·  ·  ·  ·  ·  ·  ·  ·  ·  11:40    Mar.               Mié.               Jue.               Vie.               Sáb.               Dom.                 IMPORTANTE:  Usted debe contactar a garcia doctor si le lleva más de 2 horas sentir 10 movimientos de garcia bebé.    Cada mañana, escriba la hora de inicio y comience a contar los movimientos de garcia bebé.  Hágalo colocándole un check o palomita a cada cuadrito cada vez que sienta un movimiento de garcia bebé.  Cuando haya sentido 10 “pataditas”, escriba la hora en que terminó de contar en la última columna.  Luego, complete en la cajita (arriba de la hayder de check o palomita) el número total de horas que le llevó hacerlo.  Asegúrese de leer completamente las instrucciones en la página anterior.

## 2022-11-23 NOTE — PROGRESS NOTES
Pt. Here for OB/F/U, Kick Count sheet given and explained to pt.   Good FM  Pt states has been having lower pelvic pain and back pain.   Tdap today, right deltoid

## 2022-11-28 ENCOUNTER — HOSPITAL ENCOUNTER (EMERGENCY)
Facility: MEDICAL CENTER | Age: 22
End: 2022-11-28
Attending: OBSTETRICS & GYNECOLOGY | Admitting: OBSTETRICS & GYNECOLOGY
Payer: COMMERCIAL

## 2022-11-28 ENCOUNTER — TELEPHONE (OUTPATIENT)
Dept: OBGYN | Facility: CLINIC | Age: 22
End: 2022-11-28
Payer: COMMERCIAL

## 2022-11-28 VITALS
DIASTOLIC BLOOD PRESSURE: 59 MMHG | HEART RATE: 86 BPM | SYSTOLIC BLOOD PRESSURE: 122 MMHG | RESPIRATION RATE: 16 BRPM | OXYGEN SATURATION: 97 % | HEIGHT: 61 IN | TEMPERATURE: 97.9 F | BODY MASS INDEX: 27 KG/M2 | WEIGHT: 143 LBS

## 2022-11-28 DIAGNOSIS — N30.90 CYSTITIS: ICD-10-CM

## 2022-11-28 LAB
APPEARANCE UR: CLEAR
COLOR UR AUTO: ABNORMAL
GLUCOSE UR QL STRIP.AUTO: NEGATIVE MG/DL
KETONES UR QL STRIP.AUTO: NEGATIVE MG/DL
LEUKOCYTE ESTERASE UR QL STRIP.AUTO: ABNORMAL
NITRITE UR QL STRIP.AUTO: NEGATIVE
PH UR STRIP.AUTO: 7 [PH] (ref 5–8)
PROT UR QL STRIP: 30 MG/DL
RBC UR QL AUTO: NEGATIVE
SP GR UR STRIP.AUTO: >=1.03 (ref 1–1.03)

## 2022-11-28 PROCEDURE — 59025 FETAL NON-STRESS TEST: CPT

## 2022-11-28 PROCEDURE — 99282 EMERGENCY DEPT VISIT SF MDM: CPT | Performed by: STUDENT IN AN ORGANIZED HEALTH CARE EDUCATION/TRAINING PROGRAM

## 2022-11-28 PROCEDURE — 81002 URINALYSIS NONAUTO W/O SCOPE: CPT

## 2022-11-28 PROCEDURE — 87086 URINE CULTURE/COLONY COUNT: CPT

## 2022-11-28 PROCEDURE — 99284 EMERGENCY DEPT VISIT MOD MDM: CPT

## 2022-11-28 RX ORDER — NITROFURANTOIN 25; 75 MG/1; MG/1
100 CAPSULE ORAL 2 TIMES DAILY
Qty: 10 CAPSULE | Refills: 0 | Status: ON HOLD | OUTPATIENT
Start: 2022-11-28 | End: 2023-01-26

## 2022-11-28 ASSESSMENT — PAIN SCALES - GENERAL: PAINLEVEL: 3

## 2022-11-28 NOTE — ED PROVIDER NOTES
OB ED EVALUATION NOTE    SUBJECTIVE:  Miriam Reyes Lopez is a 22 y.o.,  at 30w3d who presents for abdominal pain and decreased FM. She has noted improvement in FM since arrival. Her abdominal pain has been present for days and is bilateral and radiating to the groin. She has had some nausea in this pregnancy but denies fevers, chills, emesis, or inability to tolerate PO intake. She reports mild dysuria and urinary frequency. No hematuria or flank pain.  She denies vaginal bleeding, leakage of fluid, or contractions.    I personally reviewed the past medical and surgical histories, as well as the problem list.  This pregnancy c/b anemia    Medications:  PNV  Ferrous sulfate      OBJECTIVE:  Vital Signs:   Vitals:    22 1328   BP:    Pulse: 86   Resp:    Temp:    SpO2: 97%     General: Lying in bed, alert, conversant, NAD  Resp: unlabored, symmetric chest rise  Abdomen: gravid, NTTP.  MSK: no CVA tenderness  Extremities: No deformity or edema  Neuro: No gross focal deficits    NST read: baseline 140, moderate variability, accels present, decels absent  [place]: no contractions or irritability    LABS / IMAGING:  Results for orders placed or performed during the hospital encounter of 22   POCT urinalysis device results   Result Value Ref Range    POC Color Marisa     POC Appearance Clear     POC Glucose Negative Negative mg/dL    POC Ketones Negative Negative mg/dL    POC Specific Gravity >=1.030 (A) 1.005 - 1.030    POC Blood Negative Negative    POC Urine PH 7.0 5.0 - 8.0    POC Protein 30 (A) Negative mg/dL    POC Nitrites Negative Negative    POC Leukocyte Esterase Trace (A) Negative         ASSESSMENT AND PLAN:  22 y.o.  at 30w3d presenting for decreased FM and pain c/w round ligament pain.  Reactive NST and improvement in FM since arrival  Reports dysuria and urinary frequency; UA not completely c/w cystitis but with protein and LE. Given presence of symptoms, will treat with nitrofurantoin but  will also send urine for culture.    Follow up in office as scheduled on 12/7/22.    Patient Active Problem List    Diagnosis Date Noted    Cystitis 11/28/2022    Elevated 1hr  --> 3hr GTT wnl 11/04/2022    Anemia affecting pregnancy - 9.7/31.9 on 10/4 - stable 11/4 10/04/2022    Supervision of first pregnancy  08/29/2022       The patient was counseled to call or return for vaginal bleeding, regular contractions, leakage of fluid or decreased fetal movement.    Kalie Hall M.D.

## 2022-11-28 NOTE — TELEPHONE ENCOUNTER
Pt called in stating that she has been having dysuria and shooting pain every time that she uses the restroom. Pt states that she is having very bad cramping as well, and fetal movements have become less and less. Consulted with Daniella (midwife) who states that the patient  needs to go to L&D for evaluation. Informed the patient of providers recommendation and pt agreeable to plan. No further questions.

## 2022-11-28 NOTE — PROGRESS NOTES
"1323: Pt is a  at 30.3 weeks today, presents to L&D with c/o decreased fetal movement and \"cramping\". Pt denies VB/LOF/regular painful Uc's at this time. EFM and TOCO applied x1, VS taken, pt comfortable in bed at this time.     1445: Dr Hall at bedside to assess pt, POC discussed, discharge order received, see orders for details      "

## 2022-11-30 ENCOUNTER — TELEPHONE (OUTPATIENT)
Dept: OBGYN | Facility: CLINIC | Age: 22
End: 2022-11-30
Payer: COMMERCIAL

## 2022-11-30 LAB
BACTERIA UR CULT: NORMAL
SIGNIFICANT IND 70042: NORMAL
SITE SITE: NORMAL
SOURCE SOURCE: NORMAL

## 2022-12-01 NOTE — TELEPHONE ENCOUNTER
----- Message from Kalie Hall M.D. sent at 11/30/2022  4:06 PM PST -----  Please notify patient that her urine culture was negative so she can discontinue the antibiotic. Thanks.      Called pt and notified as above. Pt agreed and verbalized understanding.

## 2022-12-07 ENCOUNTER — ROUTINE PRENATAL (OUTPATIENT)
Dept: OBGYN | Facility: CLINIC | Age: 22
End: 2022-12-07
Payer: COMMERCIAL

## 2022-12-07 VITALS
WEIGHT: 145.21 LBS | DIASTOLIC BLOOD PRESSURE: 60 MMHG | BODY MASS INDEX: 27.44 KG/M2 | SYSTOLIC BLOOD PRESSURE: 100 MMHG

## 2022-12-07 DIAGNOSIS — Z34.03 ENCOUNTER FOR PRENATAL CARE IN THIRD TRIMESTER OF FIRST PREGNANCY: ICD-10-CM

## 2022-12-07 PROCEDURE — 0502F SUBSEQUENT PRENATAL CARE: CPT | Performed by: PHYSICIAN ASSISTANT

## 2022-12-07 NOTE — PROGRESS NOTES
Pt has no complaints with crampigng, UCs, VB, LOF, though pt was seen in L&D for abd pain on 11/28. Pt has had nausea and HA since. +FM. Pt urged to incr water intake and is drinking more soda than water, so will incr water intake. Daily FKC recommended. BRAT diet, small meals also recommended. RTC 2 wk or sooner prn.

## 2022-12-07 NOTE — PROGRESS NOTES
Pt. Here for OB/FU. Reports Good FM, states eats ice which helps with fetal movement.    Pt was seen at [facility] on 11/28/2022 for abdominal pain and decrease fetal movement.   Pt states has been having nausea and headaches.

## 2022-12-23 ENCOUNTER — ROUTINE PRENATAL (OUTPATIENT)
Dept: OBGYN | Facility: CLINIC | Age: 22
End: 2022-12-23
Payer: COMMERCIAL

## 2022-12-23 VITALS — BODY MASS INDEX: 27.81 KG/M2 | SYSTOLIC BLOOD PRESSURE: 118 MMHG | WEIGHT: 147.2 LBS | DIASTOLIC BLOOD PRESSURE: 60 MMHG

## 2022-12-23 DIAGNOSIS — Z34.03 ENCOUNTER FOR PRENATAL CARE IN THIRD TRIMESTER OF FIRST PREGNANCY: ICD-10-CM

## 2022-12-23 PROCEDURE — 0502F SUBSEQUENT PRENATAL CARE: CPT | Performed by: PHYSICIAN ASSISTANT

## 2022-12-23 NOTE — PROGRESS NOTES
Pt has no complaints with cramping, UCs, Vb, LOF, though pt is feeling occ nausea and more pressure and pelvic pain discomfort. +FM. GBS next visit. PTL precautions reviewed. Daily FKC recommended. Pt to try mint and luke, small meals - call if not improving. RTC 2 wk ro sooner prn.

## 2023-01-06 ENCOUNTER — ROUTINE PRENATAL (OUTPATIENT)
Dept: OBGYN | Facility: CLINIC | Age: 23
End: 2023-01-06
Payer: COMMERCIAL

## 2023-01-06 ENCOUNTER — HOSPITAL ENCOUNTER (OUTPATIENT)
Facility: MEDICAL CENTER | Age: 23
End: 2023-01-06
Attending: OBSTETRICS & GYNECOLOGY
Payer: COMMERCIAL

## 2023-01-06 VITALS — BODY MASS INDEX: 28.53 KG/M2 | WEIGHT: 151 LBS | DIASTOLIC BLOOD PRESSURE: 62 MMHG | SYSTOLIC BLOOD PRESSURE: 118 MMHG

## 2023-01-06 DIAGNOSIS — Z3A.36 PREGNANCY WITH 36 COMPLETED WEEKS GESTATION: ICD-10-CM

## 2023-01-06 PROCEDURE — 87150 DNA/RNA AMPLIFIED PROBE: CPT

## 2023-01-06 PROCEDURE — 0502F SUBSEQUENT PRENATAL CARE: CPT | Performed by: OBSTETRICS & GYNECOLOGY

## 2023-01-06 PROCEDURE — 87081 CULTURE SCREEN ONLY: CPT

## 2023-01-06 PROCEDURE — 76815 OB US LIMITED FETUS(S): CPT | Performed by: OBSTETRICS & GYNECOLOGY

## 2023-01-06 NOTE — PROGRESS NOTES
OB Visit Note - 36w0d     MEDICAL DECISION MAKING:  Miriam Reyes Lopez is a 22 y.o. female  at 36w0d who presents for routine prenatal care. She has no concerns at this visit and the  was used for the encounter. She is feeling fetal movement, denies LOF/VB, and is not having contractions. Limited transabdominal ultrasound was performed to confirm vertex presentation.GBS collected. SVE 1/60/-3    Today's visit addressed:   1. Prenatal care  2. Vertex by ultrasound  3. GBS collected  4.SVE 1/60/-3    Pregnancy complicated by:  Patient Active Problem List   Diagnosis    Supervision of first pregnancy     Anemia affecting pregnancy - 9.7/31.9 on 10/4 - stable     Elevated 1hr  --> 3hr GTT wnl    Cystitis       The patient will follow up in 1 week(s). She was counseled to call or return for vaginal bleeding, regular contractions, leakage of fluid or decreased fetal movement.    Cathy Bardales M.D.

## 2023-01-06 NOTE — PROGRESS NOTES
OB follow up   + fetal movement.  No VB, LOF or UC's.  Phone # [phone] (home)   Preferred pharmacy confirmed.  C/o having some cramping

## 2023-01-08 LAB — GP B STREP DNA SPEC QL NAA+PROBE: NEGATIVE

## 2023-01-12 ENCOUNTER — ROUTINE PRENATAL (OUTPATIENT)
Dept: OBGYN | Facility: CLINIC | Age: 23
End: 2023-01-12
Payer: COMMERCIAL

## 2023-01-12 VITALS — DIASTOLIC BLOOD PRESSURE: 78 MMHG | WEIGHT: 148.2 LBS | SYSTOLIC BLOOD PRESSURE: 120 MMHG | BODY MASS INDEX: 28 KG/M2

## 2023-01-12 DIAGNOSIS — Z34.03 ENCOUNTER FOR PRENATAL CARE IN THIRD TRIMESTER OF FIRST PREGNANCY: ICD-10-CM

## 2023-01-12 PROCEDURE — 0502F SUBSEQUENT PRENATAL CARE: CPT | Performed by: PHYSICIAN ASSISTANT

## 2023-01-12 NOTE — PROGRESS NOTES
Pt. Here for OB/FU. Reports Good FM.   Pt states having nausea and feels full easily.   GBS negative

## 2023-01-12 NOTE — PROGRESS NOTES
Pt has no complaints with cramping, UCs, VB, LOF. +FM. GBS neg - pt notified of results. Pt does have occ nausea and difficulty eating due to lack of space - pt to try many small meals. High calorie foods recommended too. D/w pt IOL policy and will consider checking and scheduling next visit if pt desires. RTC 1 wk or sooner prn.

## 2023-01-20 ENCOUNTER — ROUTINE PRENATAL (OUTPATIENT)
Dept: OBGYN | Facility: CLINIC | Age: 23
End: 2023-01-20
Payer: COMMERCIAL

## 2023-01-20 VITALS — BODY MASS INDEX: 28.72 KG/M2 | SYSTOLIC BLOOD PRESSURE: 118 MMHG | WEIGHT: 152 LBS | DIASTOLIC BLOOD PRESSURE: 62 MMHG

## 2023-01-20 DIAGNOSIS — Z34.03 ENCOUNTER FOR PRENATAL CARE IN THIRD TRIMESTER OF FIRST PREGNANCY: Primary | ICD-10-CM

## 2023-01-20 DIAGNOSIS — R73.09 ELEVATED GLUCOSE TOLERANCE TEST: ICD-10-CM

## 2023-01-20 DIAGNOSIS — Z36.89 NST (NON-STRESS TEST) REACTIVE: ICD-10-CM

## 2023-01-20 DIAGNOSIS — O36.8130 DECREASED FETAL MOVEMENTS IN THIRD TRIMESTER, SINGLE OR UNSPECIFIED FETUS: ICD-10-CM

## 2023-01-20 LAB
NST ACOUSTIC STIMULATION: NORMAL
NST ACTION NECESSARY: NORMAL
NST ASSESSMENT: NORMAL
NST BASELINE: NORMAL
NST INDICATIONS: NORMAL
NST OTHER DATA: NORMAL
NST READ BY: NORMAL
NST RETURN: NORMAL
NST UTERINE ACTIVITY: NORMAL

## 2023-01-20 PROCEDURE — 0502F SUBSEQUENT PRENATAL CARE: CPT

## 2023-01-23 ENCOUNTER — HOSPITAL ENCOUNTER (INPATIENT)
Facility: MEDICAL CENTER | Age: 23
LOS: 3 days | End: 2023-01-26
Attending: OBSTETRICS & GYNECOLOGY | Admitting: STUDENT IN AN ORGANIZED HEALTH CARE EDUCATION/TRAINING PROGRAM
Payer: COMMERCIAL

## 2023-01-23 ENCOUNTER — ANESTHESIA (OUTPATIENT)
Dept: OBGYN | Facility: MEDICAL CENTER | Age: 23
End: 2023-01-23
Payer: COMMERCIAL

## 2023-01-23 ENCOUNTER — TELEPHONE (OUTPATIENT)
Dept: OBGYN | Facility: CLINIC | Age: 23
End: 2023-01-23
Payer: COMMERCIAL

## 2023-01-23 ENCOUNTER — ROUTINE PRENATAL (OUTPATIENT)
Dept: OBGYN | Facility: CLINIC | Age: 23
End: 2023-01-23
Payer: COMMERCIAL

## 2023-01-23 ENCOUNTER — ANESTHESIA EVENT (OUTPATIENT)
Dept: OBGYN | Facility: MEDICAL CENTER | Age: 23
End: 2023-01-23
Payer: COMMERCIAL

## 2023-01-23 VITALS — BODY MASS INDEX: 29.17 KG/M2 | DIASTOLIC BLOOD PRESSURE: 76 MMHG | SYSTOLIC BLOOD PRESSURE: 124 MMHG | WEIGHT: 154.4 LBS

## 2023-01-23 DIAGNOSIS — Z34.03 ENCOUNTER FOR PRENATAL CARE IN THIRD TRIMESTER OF FIRST PREGNANCY: Primary | ICD-10-CM

## 2023-01-23 LAB
A1 MICROGLOB PLACENTAL VAG QL: POSITIVE
ANISOCYTOSIS BLD QL SMEAR: ABNORMAL
BASOPHILS # BLD AUTO: 0 % (ref 0–1.8)
BASOPHILS # BLD: 0 K/UL (ref 0–0.12)
BURR CELLS BLD QL SMEAR: NORMAL
EOSINOPHIL # BLD AUTO: 0 K/UL (ref 0–0.51)
EOSINOPHIL NFR BLD: 0 % (ref 0–6.9)
ERYTHROCYTE [DISTWIDTH] IN BLOOD BY AUTOMATED COUNT: 50.5 FL (ref 35.9–50)
GIANT PLATELETS BLD QL SMEAR: NORMAL
HCT VFR BLD AUTO: 33.4 % (ref 37–47)
HGB BLD-MCNC: 10 G/DL (ref 12–16)
HOLDING TUBE BB 8507: NORMAL
LG PLATELETS BLD QL SMEAR: NORMAL
LYMPHOCYTES # BLD AUTO: 0.98 K/UL (ref 1–4.8)
LYMPHOCYTES NFR BLD: 9.6 % (ref 22–41)
MANUAL DIFF BLD: NORMAL
MCH RBC QN AUTO: 21 PG (ref 27–33)
MCHC RBC AUTO-ENTMCNC: 29.9 G/DL (ref 33.6–35)
MCV RBC AUTO: 70 FL (ref 81.4–97.8)
MICROCYTES BLD QL SMEAR: ABNORMAL
MONOCYTES # BLD AUTO: 0 K/UL (ref 0–0.85)
MONOCYTES NFR BLD AUTO: 0 % (ref 0–13.4)
MORPHOLOGY BLD-IMP: NORMAL
NEUTROPHILS # BLD AUTO: 9.22 K/UL (ref 2–7.15)
NEUTROPHILS NFR BLD: 90.4 % (ref 44–72)
NRBC # BLD AUTO: 0 K/UL
NRBC BLD-RTO: 0 /100 WBC
PLATELET # BLD AUTO: 212 K/UL (ref 164–446)
PLATELET BLD QL SMEAR: NORMAL
POIKILOCYTOSIS BLD QL SMEAR: NORMAL
RBC # BLD AUTO: 4.77 M/UL (ref 4.2–5.4)
RBC BLD AUTO: PRESENT
T PALLIDUM AB SER QL IA: NORMAL
WBC # BLD AUTO: 10.2 K/UL (ref 4.8–10.8)

## 2023-01-23 PROCEDURE — 700111 HCHG RX REV CODE 636 W/ 250 OVERRIDE (IP): Performed by: ANESTHESIOLOGY

## 2023-01-23 PROCEDURE — 36415 COLL VENOUS BLD VENIPUNCTURE: CPT

## 2023-01-23 PROCEDURE — 85025 COMPLETE CBC W/AUTO DIFF WBC: CPT

## 2023-01-23 PROCEDURE — A9270 NON-COVERED ITEM OR SERVICE: HCPCS | Performed by: STUDENT IN AN ORGANIZED HEALTH CARE EDUCATION/TRAINING PROGRAM

## 2023-01-23 PROCEDURE — 85007 BL SMEAR W/DIFF WBC COUNT: CPT

## 2023-01-23 PROCEDURE — 700105 HCHG RX REV CODE 258: Performed by: STUDENT IN AN ORGANIZED HEALTH CARE EDUCATION/TRAINING PROGRAM

## 2023-01-23 PROCEDURE — 700101 HCHG RX REV CODE 250: Performed by: ANESTHESIOLOGY

## 2023-01-23 PROCEDURE — 700105 HCHG RX REV CODE 258: Performed by: ANESTHESIOLOGY

## 2023-01-23 PROCEDURE — 86780 TREPONEMA PALLIDUM: CPT

## 2023-01-23 PROCEDURE — 700111 HCHG RX REV CODE 636 W/ 250 OVERRIDE (IP): Performed by: STUDENT IN AN ORGANIZED HEALTH CARE EDUCATION/TRAINING PROGRAM

## 2023-01-23 PROCEDURE — 0502F SUBSEQUENT PRENATAL CARE: CPT | Performed by: NURSE PRACTITIONER

## 2023-01-23 PROCEDURE — 303615 HCHG EPIDURAL/SPINAL ANESTHESIA FOR LABOR

## 2023-01-23 PROCEDURE — 700102 HCHG RX REV CODE 250 W/ 637 OVERRIDE(OP): Performed by: STUDENT IN AN ORGANIZED HEALTH CARE EDUCATION/TRAINING PROGRAM

## 2023-01-23 PROCEDURE — 99282 EMERGENCY DEPT VISIT SF MDM: CPT

## 2023-01-23 PROCEDURE — 770002 HCHG ROOM/CARE - OB PRIVATE (112)

## 2023-01-23 PROCEDURE — 59025 FETAL NON-STRESS TEST: CPT

## 2023-01-23 PROCEDURE — 01967 NEURAXL LBR ANES VAG DLVR: CPT | Performed by: ANESTHESIOLOGY

## 2023-01-23 PROCEDURE — 84112 EVAL AMNIOTIC FLUID PROTEIN: CPT

## 2023-01-23 RX ORDER — CARBOPROST TROMETHAMINE 250 UG/ML
250 INJECTION, SOLUTION INTRAMUSCULAR
Status: DISCONTINUED | OUTPATIENT
Start: 2023-01-23 | End: 2023-01-24 | Stop reason: HOSPADM

## 2023-01-23 RX ORDER — OXYTOCIN 10 [USP'U]/ML
10 INJECTION, SOLUTION INTRAMUSCULAR; INTRAVENOUS
Status: DISCONTINUED | OUTPATIENT
Start: 2023-01-23 | End: 2023-01-24 | Stop reason: HOSPADM

## 2023-01-23 RX ORDER — ONDANSETRON 2 MG/ML
4 INJECTION INTRAMUSCULAR; INTRAVENOUS EVERY 6 HOURS PRN
Status: DISCONTINUED | OUTPATIENT
Start: 2023-01-23 | End: 2023-01-24 | Stop reason: HOSPADM

## 2023-01-23 RX ORDER — ACETAMINOPHEN 500 MG
1000 TABLET ORAL
Status: COMPLETED | OUTPATIENT
Start: 2023-01-23 | End: 2023-01-24

## 2023-01-23 RX ORDER — LIDOCAINE HYDROCHLORIDE AND EPINEPHRINE 15; 5 MG/ML; UG/ML
INJECTION, SOLUTION EPIDURAL
Status: COMPLETED | OUTPATIENT
Start: 2023-01-23 | End: 2023-01-23

## 2023-01-23 RX ORDER — METHYLERGONOVINE MALEATE 0.2 MG/ML
0.2 INJECTION INTRAVENOUS
Status: DISCONTINUED | OUTPATIENT
Start: 2023-01-23 | End: 2023-01-24 | Stop reason: HOSPADM

## 2023-01-23 RX ORDER — HYDROXYZINE 50 MG/1
50 TABLET, FILM COATED ORAL EVERY 6 HOURS PRN
Status: DISCONTINUED | OUTPATIENT
Start: 2023-01-23 | End: 2023-01-24 | Stop reason: HOSPADM

## 2023-01-23 RX ORDER — ALUMINA, MAGNESIA, AND SIMETHICONE 2400; 2400; 240 MG/30ML; MG/30ML; MG/30ML
30 SUSPENSION ORAL EVERY 6 HOURS PRN
Status: DISCONTINUED | OUTPATIENT
Start: 2023-01-23 | End: 2023-01-24 | Stop reason: HOSPADM

## 2023-01-23 RX ORDER — LIDOCAINE HYDROCHLORIDE 10 MG/ML
20 INJECTION, SOLUTION INFILTRATION; PERINEURAL
Status: DISCONTINUED | OUTPATIENT
Start: 2023-01-23 | End: 2023-01-24 | Stop reason: HOSPADM

## 2023-01-23 RX ORDER — SODIUM CHLORIDE, SODIUM LACTATE, POTASSIUM CHLORIDE, CALCIUM CHLORIDE 600; 310; 30; 20 MG/100ML; MG/100ML; MG/100ML; MG/100ML
INJECTION, SOLUTION INTRAVENOUS CONTINUOUS
Status: DISCONTINUED | OUTPATIENT
Start: 2023-01-23 | End: 2023-01-25 | Stop reason: HOSPADM

## 2023-01-23 RX ORDER — ONDANSETRON 4 MG/1
4 TABLET, ORALLY DISINTEGRATING ORAL EVERY 6 HOURS PRN
Status: DISCONTINUED | OUTPATIENT
Start: 2023-01-23 | End: 2023-01-24 | Stop reason: HOSPADM

## 2023-01-23 RX ORDER — IBUPROFEN 800 MG/1
800 TABLET ORAL
Status: DISCONTINUED | OUTPATIENT
Start: 2023-01-23 | End: 2023-01-24 | Stop reason: HOSPADM

## 2023-01-23 RX ORDER — MISOPROSTOL 200 UG/1
800 TABLET ORAL
Status: DISCONTINUED | OUTPATIENT
Start: 2023-01-23 | End: 2023-01-24 | Stop reason: HOSPADM

## 2023-01-23 RX ORDER — SODIUM CHLORIDE, SODIUM LACTATE, POTASSIUM CHLORIDE, AND CALCIUM CHLORIDE .6; .31; .03; .02 G/100ML; G/100ML; G/100ML; G/100ML
250 INJECTION, SOLUTION INTRAVENOUS PRN
Status: DISCONTINUED | OUTPATIENT
Start: 2023-01-23 | End: 2023-01-24 | Stop reason: HOSPADM

## 2023-01-23 RX ORDER — SODIUM CHLORIDE, SODIUM LACTATE, POTASSIUM CHLORIDE, AND CALCIUM CHLORIDE .6; .31; .03; .02 G/100ML; G/100ML; G/100ML; G/100ML
1000 INJECTION, SOLUTION INTRAVENOUS
Status: COMPLETED | OUTPATIENT
Start: 2023-01-23 | End: 2023-01-23

## 2023-01-23 RX ORDER — ROPIVACAINE HYDROCHLORIDE 2 MG/ML
INJECTION, SOLUTION EPIDURAL; INFILTRATION; PERINEURAL CONTINUOUS
Status: DISCONTINUED | OUTPATIENT
Start: 2023-01-23 | End: 2023-01-25 | Stop reason: HOSPADM

## 2023-01-23 RX ORDER — TERBUTALINE SULFATE 1 MG/ML
0.25 INJECTION, SOLUTION SUBCUTANEOUS
Status: DISCONTINUED | OUTPATIENT
Start: 2023-01-23 | End: 2023-01-24 | Stop reason: HOSPADM

## 2023-01-23 RX ADMIN — ROPIVACAINE HYDROCHLORIDE: 2 INJECTION, SOLUTION EPIDURAL; INFILTRATION at 22:37

## 2023-01-23 RX ADMIN — SODIUM CHLORIDE, POTASSIUM CHLORIDE, SODIUM LACTATE AND CALCIUM CHLORIDE 1000 ML: 600; 310; 30; 20 INJECTION, SOLUTION INTRAVENOUS at 19:00

## 2023-01-23 RX ADMIN — LIDOCAINE HYDROCHLORIDE,EPINEPHRINE BITARTRATE 3 ML: 15; .005 INJECTION, SOLUTION EPIDURAL; INFILTRATION; INTRACAUDAL; PERINEURAL at 20:01

## 2023-01-23 RX ADMIN — FLUCONAZOLE 150 MG: 50 TABLET ORAL at 22:47

## 2023-01-23 RX ADMIN — SODIUM CHLORIDE, POTASSIUM CHLORIDE, SODIUM LACTATE AND CALCIUM CHLORIDE 250 ML: 600; 310; 30; 20 INJECTION, SOLUTION INTRAVENOUS at 20:00

## 2023-01-23 RX ADMIN — OXYTOCIN 2 MILLI-UNITS/MIN: 10 INJECTION, SOLUTION INTRAMUSCULAR; INTRAVENOUS at 22:23

## 2023-01-23 RX ADMIN — OXYTOCIN 2 MILLI-UNITS/MIN: 10 INJECTION, SOLUTION INTRAMUSCULAR; INTRAVENOUS at 23:15

## 2023-01-23 RX ADMIN — EPHEDRINE SULFATE 5 MG: 50 INJECTION INTRAVENOUS at 20:50

## 2023-01-23 ASSESSMENT — LIFESTYLE VARIABLES
EVER_SMOKED: NEVER
EVER FELT BAD OR GUILTY ABOUT YOUR DRINKING: NO
TOTAL SCORE: 0
TOTAL SCORE: 0
EVER HAD A DRINK FIRST THING IN THE MORNING TO STEADY YOUR NERVES TO GET RID OF A HANGOVER: NO
HAVE YOU EVER FELT YOU SHOULD CUT DOWN ON YOUR DRINKING: NO
HOW MANY TIMES IN THE PAST YEAR HAVE YOU HAD 5 OR MORE DRINKS IN A DAY: 0
ALCOHOL_USE: NO
AVERAGE NUMBER OF DAYS PER WEEK YOU HAVE A DRINK CONTAINING ALCOHOL: 0
TOTAL SCORE: 0
CONSUMPTION TOTAL: NEGATIVE
DOES PATIENT WANT TO STOP DRINKING: NO
HAVE PEOPLE ANNOYED YOU BY CRITICIZING YOUR DRINKING: NO
ON A TYPICAL DAY WHEN YOU DRINK ALCOHOL HOW MANY DRINKS DO YOU HAVE: 0

## 2023-01-23 ASSESSMENT — PATIENT HEALTH QUESTIONNAIRE - PHQ9
5. POOR APPETITE OR OVEREATING: SEVERAL DAYS
SUM OF ALL RESPONSES TO PHQ9 QUESTIONS 1 AND 2: 1
8. MOVING OR SPEAKING SO SLOWLY THAT OTHER PEOPLE COULD HAVE NOTICED. OR THE OPPOSITE, BEING SO FIGETY OR RESTLESS THAT YOU HAVE BEEN MOVING AROUND A LOT MORE THAN USUAL: NOT AT ALL
2. FEELING DOWN, DEPRESSED, IRRITABLE, OR HOPELESS: SEVERAL DAYS
9. THOUGHTS THAT YOU WOULD BE BETTER OFF DEAD, OR OF HURTING YOURSELF: NOT AT ALL
6. FEELING BAD ABOUT YOURSELF - OR THAT YOU ARE A FAILURE OR HAVE LET YOURSELF OR YOUR FAMILY DOWN: NOT AL ALL
SUM OF ALL RESPONSES TO PHQ QUESTIONS 1-9: 4
1. LITTLE INTEREST OR PLEASURE IN DOING THINGS: NOT AT ALL
4. FEELING TIRED OR HAVING LITTLE ENERGY: SEVERAL DAYS
7. TROUBLE CONCENTRATING ON THINGS, SUCH AS READING THE NEWSPAPER OR WATCHING TELEVISION: NOT AT ALL
3. TROUBLE FALLING OR STAYING ASLEEP OR SLEEPING TOO MUCH: SEVERAL DAYS

## 2023-01-23 ASSESSMENT — COPD QUESTIONNAIRES
IN THE PAST 12 MONTHS DO YOU DO LESS THAN YOU USED TO BECAUSE OF YOUR BREATHING PROBLEMS: DISAGREE/UNSURE
HAVE YOU SMOKED AT LEAST 100 CIGARETTES IN YOUR ENTIRE LIFE: NO/DON'T KNOW
DO YOU EVER COUGH UP ANY MUCUS OR PHLEGM?: NO/ONLY WITH OCCASIONAL COLDS OR INFECTIONS
COPD SCREENING SCORE: 0
DURING THE PAST 4 WEEKS HOW MUCH DID YOU FEEL SHORT OF BREATH: NONE/LITTLE OF THE TIME

## 2023-01-23 ASSESSMENT — PAIN DESCRIPTION - PAIN TYPE: TYPE: ACUTE PAIN

## 2023-01-23 NOTE — PROGRESS NOTES
S:  Pt is  at 38w3d here for routine OB follow up.  Reports brownish discharge with itching x 4 days. Also reports cramping. Reports good FM.  Denies VB, or RUCs     O:  See above vitals        Fetal position: cephalic        GBS negative  -- reviewed w pt.          SSE: watery discharge noted, with small amount of bloody show.         Wet mount: positive for yeast buds        [place]: equivocal due to lubricant.     A:  IUP at 38w3d  Patient Active Problem List    Diagnosis Date Noted    Cystitis 2022    Elevated 1hr  --> 3hr GTT wnl 2022    Anemia affecting pregnancy - 9.7/31.9 on 10/4 - stable 11/4 10/04/2022    Supervision of first pregnancy  2022       P:  1.  Anticipatory guidance given         2.  Labor precautions given.  Instructions given on where to go.  Pt receptive to education.         3.  Advised pt to present to hospital for R/O ROM. Triage called, report given       4.  Questions answered.         5.  F/u 1wk

## 2023-01-23 NOTE — PROGRESS NOTES
Pt. Here for OB/FU fit in for vaginal discharge with a brownish color x 1week  Reports Good FM.   GBS negative

## 2023-01-23 NOTE — TELEPHONE ENCOUNTER
Patient called c/o vaginal discharge with burning and itching x1 week. Denied vaginal bleeding, cramping, or decrease in fetal movement. Patient was scheduled to have a pelvic check today, patient agreed and had no further concerns at this time.

## 2023-01-24 PROBLEM — O13.3 GESTATIONAL HYPERTENSION, THIRD TRIMESTER: Status: ACTIVE | Noted: 2023-01-24

## 2023-01-24 LAB
ALBUMIN SERPL BCP-MCNC: 2.6 G/DL (ref 3.2–4.9)
ALBUMIN/GLOB SERPL: 0.8 G/DL
ALP SERPL-CCNC: 216 U/L (ref 30–99)
ALT SERPL-CCNC: 14 U/L (ref 2–50)
ANION GAP SERPL CALC-SCNC: 13 MMOL/L (ref 7–16)
AST SERPL-CCNC: 29 U/L (ref 12–45)
BILIRUB SERPL-MCNC: 0.4 MG/DL (ref 0.1–1.5)
BUN SERPL-MCNC: 9 MG/DL (ref 8–22)
CALCIUM ALBUM COR SERPL-MCNC: 9.4 MG/DL (ref 8.5–10.5)
CALCIUM SERPL-MCNC: 8.3 MG/DL (ref 8.5–10.5)
CHLORIDE SERPL-SCNC: 109 MMOL/L (ref 96–112)
CO2 SERPL-SCNC: 17 MMOL/L (ref 20–33)
CREAT SERPL-MCNC: 0.91 MG/DL (ref 0.5–1.4)
CREAT UR-MCNC: 66.72 MG/DL
ERYTHROCYTE [DISTWIDTH] IN BLOOD BY AUTOMATED COUNT: 49.6 FL (ref 35.9–50)
GFR SERPLBLD CREATININE-BSD FMLA CKD-EPI: 90 ML/MIN/1.73 M 2
GLOBULIN SER CALC-MCNC: 3.3 G/DL (ref 1.9–3.5)
GLUCOSE SERPL-MCNC: 70 MG/DL (ref 65–99)
HCT VFR BLD AUTO: 30.2 % (ref 37–47)
HGB BLD-MCNC: 9 G/DL (ref 12–16)
MCH RBC QN AUTO: 20.5 PG (ref 27–33)
MCHC RBC AUTO-ENTMCNC: 29.8 G/DL (ref 33.6–35)
MCV RBC AUTO: 68.9 FL (ref 81.4–97.8)
PLATELET # BLD AUTO: 176 K/UL (ref 164–446)
POTASSIUM SERPL-SCNC: 4.3 MMOL/L (ref 3.6–5.5)
PROT SERPL-MCNC: 5.9 G/DL (ref 6–8.2)
PROT UR-MCNC: 98 MG/DL (ref 0–15)
PROT/CREAT UR: 1469 MG/G (ref 10–107)
RBC # BLD AUTO: 4.38 M/UL (ref 4.2–5.4)
SODIUM SERPL-SCNC: 139 MMOL/L (ref 135–145)
URATE SERPL-MCNC: 6.8 MG/DL (ref 1.9–8.2)
WBC # BLD AUTO: 10.2 K/UL (ref 4.8–10.8)

## 2023-01-24 PROCEDURE — 160041 HCHG SURGERY MINUTES - EA ADDL 1 MIN LEVEL 4: Performed by: OBSTETRICS & GYNECOLOGY

## 2023-01-24 PROCEDURE — C1765 ADHESION BARRIER: HCPCS | Performed by: OBSTETRICS & GYNECOLOGY

## 2023-01-24 PROCEDURE — 700102 HCHG RX REV CODE 250 W/ 637 OVERRIDE(OP): Performed by: ANESTHESIOLOGY

## 2023-01-24 PROCEDURE — 160035 HCHG PACU - 1ST 60 MINS PHASE I: Performed by: OBSTETRICS & GYNECOLOGY

## 2023-01-24 PROCEDURE — 01967 NEURAXL LBR ANES VAG DLVR: CPT | Performed by: ANESTHESIOLOGY

## 2023-01-24 PROCEDURE — 700111 HCHG RX REV CODE 636 W/ 250 OVERRIDE (IP): Performed by: ANESTHESIOLOGY

## 2023-01-24 PROCEDURE — 700105 HCHG RX REV CODE 258: Performed by: STUDENT IN AN ORGANIZED HEALTH CARE EDUCATION/TRAINING PROGRAM

## 2023-01-24 PROCEDURE — 84156 ASSAY OF PROTEIN URINE: CPT

## 2023-01-24 PROCEDURE — 700111 HCHG RX REV CODE 636 W/ 250 OVERRIDE (IP): Performed by: OBSTETRICS & GYNECOLOGY

## 2023-01-24 PROCEDURE — 700105 HCHG RX REV CODE 258: Performed by: ANESTHESIOLOGY

## 2023-01-24 PROCEDURE — A9270 NON-COVERED ITEM OR SERVICE: HCPCS | Performed by: STUDENT IN AN ORGANIZED HEALTH CARE EDUCATION/TRAINING PROGRAM

## 2023-01-24 PROCEDURE — 160002 HCHG RECOVERY MINUTES (STAT): Performed by: OBSTETRICS & GYNECOLOGY

## 2023-01-24 PROCEDURE — C1755 CATHETER, INTRASPINAL: HCPCS | Performed by: OBSTETRICS & GYNECOLOGY

## 2023-01-24 PROCEDURE — A9270 NON-COVERED ITEM OR SERVICE: HCPCS | Performed by: ANESTHESIOLOGY

## 2023-01-24 PROCEDURE — 160048 HCHG OR STATISTICAL LEVEL 1-5: Performed by: OBSTETRICS & GYNECOLOGY

## 2023-01-24 PROCEDURE — 01968 ANES/ANALG CS DLVR NEURAXIAL: CPT | Performed by: ANESTHESIOLOGY

## 2023-01-24 PROCEDURE — 770002 HCHG ROOM/CARE - OB PRIVATE (112)

## 2023-01-24 PROCEDURE — 85027 COMPLETE CBC AUTOMATED: CPT

## 2023-01-24 PROCEDURE — 36415 COLL VENOUS BLD VENIPUNCTURE: CPT

## 2023-01-24 PROCEDURE — 80053 COMPREHEN METABOLIC PANEL: CPT

## 2023-01-24 PROCEDURE — 700102 HCHG RX REV CODE 250 W/ 637 OVERRIDE(OP): Performed by: STUDENT IN AN ORGANIZED HEALTH CARE EDUCATION/TRAINING PROGRAM

## 2023-01-24 PROCEDURE — 59510 CESAREAN DELIVERY: CPT | Performed by: OBSTETRICS & GYNECOLOGY

## 2023-01-24 PROCEDURE — 84550 ASSAY OF BLOOD/URIC ACID: CPT

## 2023-01-24 PROCEDURE — 160029 HCHG SURGERY MINUTES - 1ST 30 MINS LEVEL 4: Performed by: OBSTETRICS & GYNECOLOGY

## 2023-01-24 PROCEDURE — 700101 HCHG RX REV CODE 250: Performed by: ANESTHESIOLOGY

## 2023-01-24 PROCEDURE — 160009 HCHG ANES TIME/MIN: Performed by: OBSTETRICS & GYNECOLOGY

## 2023-01-24 PROCEDURE — 82570 ASSAY OF URINE CREATININE: CPT

## 2023-01-24 PROCEDURE — 700111 HCHG RX REV CODE 636 W/ 250 OVERRIDE (IP): Performed by: STUDENT IN AN ORGANIZED HEALTH CARE EDUCATION/TRAINING PROGRAM

## 2023-01-24 RX ORDER — MIDAZOLAM HYDROCHLORIDE 1 MG/ML
1 INJECTION INTRAMUSCULAR; INTRAVENOUS
Status: CANCELLED | OUTPATIENT
Start: 2023-01-24

## 2023-01-24 RX ORDER — SODIUM CHLORIDE, SODIUM GLUCONATE, SODIUM ACETATE, POTASSIUM CHLORIDE AND MAGNESIUM CHLORIDE 526; 502; 368; 37; 30 MG/100ML; MG/100ML; MG/100ML; MG/100ML; MG/100ML
INJECTION, SOLUTION INTRAVENOUS
Status: DISCONTINUED | OUTPATIENT
Start: 2023-01-24 | End: 2023-01-24 | Stop reason: HOSPADM

## 2023-01-24 RX ORDER — OXYTOCIN 10 [USP'U]/ML
INJECTION, SOLUTION INTRAMUSCULAR; INTRAVENOUS PRN
Status: DISCONTINUED | OUTPATIENT
Start: 2023-01-24 | End: 2023-01-24 | Stop reason: SURG

## 2023-01-24 RX ORDER — ONDANSETRON 2 MG/ML
INJECTION INTRAMUSCULAR; INTRAVENOUS PRN
Status: DISCONTINUED | OUTPATIENT
Start: 2023-01-24 | End: 2023-01-24 | Stop reason: SURG

## 2023-01-24 RX ORDER — HYDRALAZINE HYDROCHLORIDE 20 MG/ML
5 INJECTION INTRAMUSCULAR; INTRAVENOUS
Status: CANCELLED | OUTPATIENT
Start: 2023-01-24

## 2023-01-24 RX ORDER — DOCUSATE SODIUM 100 MG/1
100 CAPSULE, LIQUID FILLED ORAL 2 TIMES DAILY PRN
Status: DISCONTINUED | OUTPATIENT
Start: 2023-01-24 | End: 2023-01-26 | Stop reason: HOSPADM

## 2023-01-24 RX ORDER — HYDROMORPHONE HYDROCHLORIDE 1 MG/ML
0.2 INJECTION, SOLUTION INTRAMUSCULAR; INTRAVENOUS; SUBCUTANEOUS
Status: CANCELLED | OUTPATIENT
Start: 2023-01-24

## 2023-01-24 RX ORDER — CEFAZOLIN SODIUM 1 G/3ML
INJECTION, POWDER, FOR SOLUTION INTRAMUSCULAR; INTRAVENOUS PRN
Status: DISCONTINUED | OUTPATIENT
Start: 2023-01-24 | End: 2023-01-24 | Stop reason: SURG

## 2023-01-24 RX ORDER — HYDROMORPHONE HYDROCHLORIDE 1 MG/ML
0.1 INJECTION, SOLUTION INTRAMUSCULAR; INTRAVENOUS; SUBCUTANEOUS
Status: CANCELLED | OUTPATIENT
Start: 2023-01-24

## 2023-01-24 RX ORDER — OXYCODONE HCL 5 MG/5 ML
10 SOLUTION, ORAL ORAL
Status: CANCELLED | OUTPATIENT
Start: 2023-01-24

## 2023-01-24 RX ORDER — ONDANSETRON 4 MG/1
4 TABLET, ORALLY DISINTEGRATING ORAL EVERY 6 HOURS PRN
Status: DISCONTINUED | OUTPATIENT
Start: 2023-01-25 | End: 2023-01-26 | Stop reason: HOSPADM

## 2023-01-24 RX ORDER — DEXAMETHASONE SODIUM PHOSPHATE 4 MG/ML
INJECTION, SOLUTION INTRA-ARTICULAR; INTRALESIONAL; INTRAMUSCULAR; INTRAVENOUS; SOFT TISSUE PRN
Status: DISCONTINUED | OUTPATIENT
Start: 2023-01-24 | End: 2023-01-24 | Stop reason: SURG

## 2023-01-24 RX ORDER — HYDROMORPHONE HYDROCHLORIDE 1 MG/ML
0.4 INJECTION, SOLUTION INTRAMUSCULAR; INTRAVENOUS; SUBCUTANEOUS
Status: ACTIVE | OUTPATIENT
Start: 2023-01-24 | End: 2023-01-25

## 2023-01-24 RX ORDER — MEPERIDINE HYDROCHLORIDE 25 MG/ML
12.5 INJECTION INTRAMUSCULAR; INTRAVENOUS; SUBCUTANEOUS
Status: CANCELLED | OUTPATIENT
Start: 2023-01-24

## 2023-01-24 RX ORDER — ACETAMINOPHEN 500 MG
1000 TABLET ORAL EVERY 6 HOURS PRN
Status: DISCONTINUED | OUTPATIENT
Start: 2023-01-28 | End: 2023-01-26 | Stop reason: HOSPADM

## 2023-01-24 RX ORDER — SODIUM CHLORIDE, SODIUM GLUCONATE, SODIUM ACETATE, POTASSIUM CHLORIDE AND MAGNESIUM CHLORIDE 526; 502; 368; 37; 30 MG/100ML; MG/100ML; MG/100ML; MG/100ML; MG/100ML
500 INJECTION, SOLUTION INTRAVENOUS CONTINUOUS
Status: CANCELLED | OUTPATIENT
Start: 2023-01-24

## 2023-01-24 RX ORDER — DIPHENHYDRAMINE HYDROCHLORIDE 50 MG/ML
12.5 INJECTION INTRAMUSCULAR; INTRAVENOUS EVERY 6 HOURS PRN
Status: ACTIVE | OUTPATIENT
Start: 2023-01-24 | End: 2023-01-25

## 2023-01-24 RX ORDER — KETOROLAC TROMETHAMINE 30 MG/ML
INJECTION, SOLUTION INTRAMUSCULAR; INTRAVENOUS PRN
Status: DISCONTINUED | OUTPATIENT
Start: 2023-01-24 | End: 2023-01-24 | Stop reason: SURG

## 2023-01-24 RX ORDER — AZITHROMYCIN 500 MG/5ML
500 INJECTION, POWDER, LYOPHILIZED, FOR SOLUTION INTRAVENOUS EVERY 24 HOURS
Status: COMPLETED | OUTPATIENT
Start: 2023-01-24 | End: 2023-01-24

## 2023-01-24 RX ORDER — METOCLOPRAMIDE HYDROCHLORIDE 5 MG/ML
10 INJECTION INTRAMUSCULAR; INTRAVENOUS ONCE
Status: COMPLETED | OUTPATIENT
Start: 2023-01-24 | End: 2023-01-24

## 2023-01-24 RX ORDER — SODIUM CHLORIDE, SODIUM GLUCONATE, SODIUM ACETATE, POTASSIUM CHLORIDE AND MAGNESIUM CHLORIDE 526; 502; 368; 37; 30 MG/100ML; MG/100ML; MG/100ML; MG/100ML; MG/100ML
1000 INJECTION, SOLUTION INTRAVENOUS ONCE
Status: COMPLETED | OUTPATIENT
Start: 2023-01-24 | End: 2023-01-24

## 2023-01-24 RX ORDER — OXYCODONE HCL 5 MG/5 ML
5 SOLUTION, ORAL ORAL
Status: CANCELLED | OUTPATIENT
Start: 2023-01-24

## 2023-01-24 RX ORDER — ONDANSETRON 2 MG/ML
4 INJECTION INTRAMUSCULAR; INTRAVENOUS EVERY 6 HOURS PRN
Status: ACTIVE | OUTPATIENT
Start: 2023-01-24 | End: 2023-01-25

## 2023-01-24 RX ORDER — SCOLOPAMINE TRANSDERMAL SYSTEM 1 MG/1
PATCH, EXTENDED RELEASE TRANSDERMAL PRN
Status: DISCONTINUED | OUTPATIENT
Start: 2023-01-24 | End: 2023-01-24 | Stop reason: SURG

## 2023-01-24 RX ORDER — HYDROMORPHONE HYDROCHLORIDE 1 MG/ML
0.4 INJECTION, SOLUTION INTRAMUSCULAR; INTRAVENOUS; SUBCUTANEOUS
Status: CANCELLED | OUTPATIENT
Start: 2023-01-24

## 2023-01-24 RX ORDER — OXYCODONE HYDROCHLORIDE 10 MG/1
10 TABLET ORAL EVERY 4 HOURS PRN
Status: ACTIVE | OUTPATIENT
Start: 2023-01-24 | End: 2023-01-25

## 2023-01-24 RX ORDER — ACETAMINOPHEN 500 MG
1000 TABLET ORAL EVERY 6 HOURS
Status: COMPLETED | OUTPATIENT
Start: 2023-01-24 | End: 2023-01-25

## 2023-01-24 RX ORDER — OXYCODONE HYDROCHLORIDE 10 MG/1
10 TABLET ORAL EVERY 4 HOURS PRN
Status: DISCONTINUED | OUTPATIENT
Start: 2023-01-25 | End: 2023-01-26 | Stop reason: HOSPADM

## 2023-01-24 RX ORDER — METOPROLOL TARTRATE 1 MG/ML
1 INJECTION, SOLUTION INTRAVENOUS
Status: CANCELLED | OUTPATIENT
Start: 2023-01-24

## 2023-01-24 RX ORDER — MORPHINE SULFATE 0.5 MG/ML
INJECTION, SOLUTION EPIDURAL; INTRATHECAL; INTRAVENOUS PRN
Status: DISCONTINUED | OUTPATIENT
Start: 2023-01-24 | End: 2023-01-24 | Stop reason: HOSPADM

## 2023-01-24 RX ORDER — HYDROMORPHONE HYDROCHLORIDE 1 MG/ML
0.2 INJECTION, SOLUTION INTRAMUSCULAR; INTRAVENOUS; SUBCUTANEOUS
Status: ACTIVE | OUTPATIENT
Start: 2023-01-24 | End: 2023-01-25

## 2023-01-24 RX ORDER — DIPHENHYDRAMINE HCL 25 MG
25 TABLET ORAL EVERY 6 HOURS PRN
Status: DISCONTINUED | OUTPATIENT
Start: 2023-01-25 | End: 2023-01-26 | Stop reason: HOSPADM

## 2023-01-24 RX ORDER — EPINEPHRINE 1 MG/ML(1)
AMPUL (ML) INJECTION PRN
Status: DISCONTINUED | OUTPATIENT
Start: 2023-01-24 | End: 2023-01-24 | Stop reason: SURG

## 2023-01-24 RX ORDER — SODIUM CHLORIDE, SODIUM LACTATE, POTASSIUM CHLORIDE, CALCIUM CHLORIDE 600; 310; 30; 20 MG/100ML; MG/100ML; MG/100ML; MG/100ML
INJECTION, SOLUTION INTRAVENOUS PRN
Status: DISCONTINUED | OUTPATIENT
Start: 2023-01-24 | End: 2023-01-26 | Stop reason: HOSPADM

## 2023-01-24 RX ORDER — DIPHENHYDRAMINE HYDROCHLORIDE 50 MG/ML
25 INJECTION INTRAMUSCULAR; INTRAVENOUS EVERY 6 HOURS PRN
Status: ACTIVE | OUTPATIENT
Start: 2023-01-24 | End: 2023-01-25

## 2023-01-24 RX ORDER — CITRIC ACID/SODIUM CITRATE 334-500MG
30 SOLUTION, ORAL ORAL ONCE
Status: COMPLETED | OUTPATIENT
Start: 2023-01-24 | End: 2023-01-24

## 2023-01-24 RX ORDER — DIPHENHYDRAMINE HYDROCHLORIDE 50 MG/ML
25 INJECTION INTRAMUSCULAR; INTRAVENOUS EVERY 6 HOURS PRN
Status: DISCONTINUED | OUTPATIENT
Start: 2023-01-25 | End: 2023-01-26 | Stop reason: HOSPADM

## 2023-01-24 RX ORDER — OXYCODONE HYDROCHLORIDE 5 MG/1
5 TABLET ORAL EVERY 4 HOURS PRN
Status: DISPENSED | OUTPATIENT
Start: 2023-01-24 | End: 2023-01-25

## 2023-01-24 RX ORDER — KETOROLAC TROMETHAMINE 30 MG/ML
30 INJECTION, SOLUTION INTRAMUSCULAR; INTRAVENOUS EVERY 6 HOURS
Status: DISCONTINUED | OUTPATIENT
Start: 2023-01-24 | End: 2023-01-25

## 2023-01-24 RX ORDER — HALOPERIDOL 5 MG/ML
1 INJECTION INTRAMUSCULAR
Status: CANCELLED | OUTPATIENT
Start: 2023-01-24

## 2023-01-24 RX ORDER — OXYCODONE HYDROCHLORIDE 5 MG/1
5 TABLET ORAL EVERY 4 HOURS PRN
Status: DISCONTINUED | OUTPATIENT
Start: 2023-01-25 | End: 2023-01-26 | Stop reason: HOSPADM

## 2023-01-24 RX ORDER — ACETAMINOPHEN 500 MG
1000 TABLET ORAL EVERY 6 HOURS
Status: DISCONTINUED | OUTPATIENT
Start: 2023-01-25 | End: 2023-01-26 | Stop reason: HOSPADM

## 2023-01-24 RX ORDER — IBUPROFEN 800 MG/1
800 TABLET ORAL EVERY 8 HOURS
Status: DISCONTINUED | OUTPATIENT
Start: 2023-01-25 | End: 2023-01-25

## 2023-01-24 RX ORDER — ONDANSETRON 2 MG/ML
4 INJECTION INTRAMUSCULAR; INTRAVENOUS
Status: CANCELLED | OUTPATIENT
Start: 2023-01-24

## 2023-01-24 RX ORDER — BUPIVACAINE HYDROCHLORIDE 2.5 MG/ML
INJECTION, SOLUTION EPIDURAL; INFILTRATION; INTRACAUDAL
Status: ACTIVE
Start: 2023-01-24 | End: 2023-01-24

## 2023-01-24 RX ORDER — LABETALOL HYDROCHLORIDE 5 MG/ML
5 INJECTION, SOLUTION INTRAVENOUS
Status: CANCELLED | OUTPATIENT
Start: 2023-01-24

## 2023-01-24 RX ORDER — IBUPROFEN 800 MG/1
800 TABLET ORAL EVERY 8 HOURS PRN
Status: DISCONTINUED | OUTPATIENT
Start: 2023-01-28 | End: 2023-01-25

## 2023-01-24 RX ORDER — LIDOCAINE HYDROCHLORIDE 20 MG/ML
INJECTION, SOLUTION EPIDURAL; INFILTRATION; INTRACAUDAL; PERINEURAL PRN
Status: DISCONTINUED | OUTPATIENT
Start: 2023-01-24 | End: 2023-01-24 | Stop reason: SURG

## 2023-01-24 RX ORDER — ONDANSETRON 2 MG/ML
4 INJECTION INTRAMUSCULAR; INTRAVENOUS EVERY 6 HOURS PRN
Status: DISCONTINUED | OUTPATIENT
Start: 2023-01-25 | End: 2023-01-26 | Stop reason: HOSPADM

## 2023-01-24 RX ORDER — DIPHENHYDRAMINE HYDROCHLORIDE 50 MG/ML
12.5 INJECTION INTRAMUSCULAR; INTRAVENOUS
Status: CANCELLED | OUTPATIENT
Start: 2023-01-24

## 2023-01-24 RX ADMIN — OXYTOCIN 125 ML/HR: 10 INJECTION, SOLUTION INTRAMUSCULAR; INTRAVENOUS at 18:26

## 2023-01-24 RX ADMIN — SODIUM CHLORIDE, POTASSIUM CHLORIDE, SODIUM LACTATE AND CALCIUM CHLORIDE: 600; 310; 30; 20 INJECTION, SOLUTION INTRAVENOUS at 00:08

## 2023-01-24 RX ADMIN — KETOROLAC TROMETHAMINE 30 MG: 30 INJECTION, SOLUTION INTRAMUSCULAR; INTRAVENOUS at 23:41

## 2023-01-24 RX ADMIN — ACETAMINOPHEN 1000 MG: 500 TABLET ORAL at 04:49

## 2023-01-24 RX ADMIN — LIDOCAINE HYDROCHLORIDE 5 ML: 20 INJECTION, SOLUTION EPIDURAL; INFILTRATION; INTRACAUDAL at 15:53

## 2023-01-24 RX ADMIN — METOCLOPRAMIDE 10 MG: 5 INJECTION, SOLUTION INTRAMUSCULAR; INTRAVENOUS at 15:41

## 2023-01-24 RX ADMIN — KETOROLAC TROMETHAMINE 30 MG: 30 INJECTION, SOLUTION INTRAMUSCULAR at 16:36

## 2023-01-24 RX ADMIN — EPINEPHRINE 25 MCG: 1 INJECTION INTRAMUSCULAR; INTRAVENOUS; SUBCUTANEOUS at 15:53

## 2023-01-24 RX ADMIN — AZITHROMYCIN FOR INJECTION INJECTION, POWDER, LYOPHILIZED, FOR SOLUTION 500 MG: 500 INJECTION INTRAVENOUS at 15:02

## 2023-01-24 RX ADMIN — SODIUM CITRATE AND CITRIC ACID MONOHYDRATE 30 ML: 500; 334 SOLUTION ORAL at 15:40

## 2023-01-24 RX ADMIN — SODIUM CHLORIDE, SODIUM GLUCONATE, SODIUM ACETATE, POTASSIUM CHLORIDE AND MAGNESIUM CHLORIDE: 526; 502; 368; 37; 30 INJECTION, SOLUTION INTRAVENOUS at 15:45

## 2023-01-24 RX ADMIN — FAMOTIDINE 20 MG: 10 INJECTION, SOLUTION INTRAVENOUS at 15:41

## 2023-01-24 RX ADMIN — ONDANSETRON 4 MG: 2 INJECTION INTRAMUSCULAR; INTRAVENOUS at 16:14

## 2023-01-24 RX ADMIN — EPINEPHRINE 50 MCG: 1 INJECTION INTRAMUSCULAR; INTRAVENOUS; SUBCUTANEOUS at 15:37

## 2023-01-24 RX ADMIN — ROPIVACAINE HYDROCHLORIDE: 2 INJECTION, SOLUTION EPIDURAL; INFILTRATION at 11:26

## 2023-01-24 RX ADMIN — BUPIVACAINE HYDROCHLORIDE 10 ML: 2.5 INJECTION, SOLUTION EPIDURAL; INFILTRATION; INTRACAUDAL; PERINEURAL at 09:16

## 2023-01-24 RX ADMIN — CEFAZOLIN 2 G: 330 INJECTION, POWDER, FOR SOLUTION INTRAMUSCULAR; INTRAVENOUS at 15:53

## 2023-01-24 RX ADMIN — LIDOCAINE HYDROCHLORIDE 10 ML: 20 INJECTION, SOLUTION EPIDURAL; INFILTRATION; INTRACAUDAL at 15:37

## 2023-01-24 RX ADMIN — ROPIVACAINE HYDROCHLORIDE: 2 INJECTION, SOLUTION EPIDURAL; INFILTRATION at 04:43

## 2023-01-24 RX ADMIN — ACETAMINOPHEN 1000 MG: 500 TABLET ORAL at 20:36

## 2023-01-24 RX ADMIN — FENTANYL CITRATE 100 MCG: 50 INJECTION, SOLUTION INTRAMUSCULAR; INTRAVENOUS at 15:37

## 2023-01-24 RX ADMIN — SODIUM CHLORIDE, SODIUM GLUCONATE, SODIUM ACETATE, POTASSIUM CHLORIDE AND MAGNESIUM CHLORIDE 1000 ML: 526; 502; 368; 37; 30 INJECTION, SOLUTION INTRAVENOUS at 14:48

## 2023-01-24 RX ADMIN — DEXAMETHASONE SODIUM PHOSPHATE 4 MG: 4 INJECTION, SOLUTION INTRA-ARTICULAR; INTRALESIONAL; INTRAMUSCULAR; INTRAVENOUS; SOFT TISSUE at 15:54

## 2023-01-24 RX ADMIN — SCOPALAMINE 1 PATCH: 1 PATCH, EXTENDED RELEASE TRANSDERMAL at 16:10

## 2023-01-24 RX ADMIN — OXYTOCIN 20 UNITS: 10 INJECTION, SOLUTION INTRAMUSCULAR; INTRAVENOUS at 16:08

## 2023-01-24 RX ADMIN — MORPHINE SULFATE 1.5 MG: 0.5 INJECTION, SOLUTION EPIDURAL; INTRATHECAL; INTRAVENOUS at 16:12

## 2023-01-24 ASSESSMENT — EDINBURGH POSTNATAL DEPRESSION SCALE (EPDS)
I HAVE BEEN ANXIOUS OR WORRIED FOR NO GOOD REASON: NO, NOT AT ALL
I HAVE BEEN ABLE TO LAUGH AND SEE THE FUNNY SIDE OF THINGS: AS MUCH AS I ALWAYS COULD
I HAVE BEEN SO UNHAPPY THAT I HAVE BEEN CRYING: NO, NEVER
I HAVE FELT SCARED OR PANICKY FOR NO GOOD REASON: NO, NOT AT ALL
THE THOUGHT OF HARMING MYSELF HAS OCCURRED TO ME: NEVER
I HAVE FELT SAD OR MISERABLE: NO, NOT AT ALL
I HAVE BLAMED MYSELF UNNECESSARILY WHEN THINGS WENT WRONG: NOT VERY OFTEN
THINGS HAVE BEEN GETTING ON TOP OF ME: YES, SOMETIMES I HAVEN'T BEEN COPING AS WELL AS USUAL
I HAVE BEEN SO UNHAPPY THAT I HAVE HAD DIFFICULTY SLEEPING: NOT AT ALL
I HAVE LOOKED FORWARD WITH ENJOYMENT TO THINGS: AS MUCH AS I EVER DID

## 2023-01-24 ASSESSMENT — PAIN SCALES - GENERAL: PAIN_LEVEL: 1

## 2023-01-24 NOTE — NON-PROVIDER
This note is intended for the purposes of medical student education and feedback only.   Please refer to the documentation by this patient's assigned medical practitioner for details of care and plans.    HPI  This is Lithuanian speaking 22 year old female  who presents to L&D triage at a gestational age of 38w4d with possible rupture of membranes. The patient reports that she had a gush of brownish fluid at approximately 0100 this morning. She was seen at outpatient OB today and was recommended to come to the hospital. She states that she has had cramping abdominal pain, back pain, mild dyspnea, dysuria, and urinary frequency. She reports that she has been feeling contractions and fetal movement regularly. She had one episode of spotting a few days ago and denies vaginal bleeding today. She denies any problems with her current pregnancy, taking any medications, and a personal or familial history of medical problems. She denies having any allergies or prior surgeries.      Vital Signs:  Current temp: 98.8F  Current HR: 79  Current BP: 128/88  Current RR: 18    Tocometry  Maternal contractions, moderate FHR variably, +accelerations, no decelerations.     Physical Exam (Components adjusted/added/removed when applicable):  General: no acute distress, nontoxic appearing   Head: normocephalic, atraumatic  Neck: supple, no masses, no tenderness  Cardiovascular: normal peripheral perfusion, no lower extremity edema, no JVD  Respiratory: respirations are non-labored  Musculoskeletal: no obvious deformity, normal range of motion in all major joints  Gastrointestinal: Soft, moderate tenderness to light palpation, no rebound or guarding. uterine fundus approximately 38cm  Neuro: A&Ox4. No focal neuro deficit observed.   Skin: Warm, dry, intact, no rashes    Lab Results:    Budding yeast were found on outpatient microscopy earlier today.     GBS-    Amnisure+        ASSESSMENT/PLAN  22 year old  female approximate  gestation age 38w4d  1. Rupture of membranes at 0100 on 1/23/23. Amnisure+   -Cervical dilation 1cm PTA. Serial examinations and continuous tocometry. Discussed labor augmentation with oxytocin. The patient wishes to augment her labor at this time.  2. Dysuria/Frequency.   -Budding yeast on microscopy. Treat with fluconazole  -UA w/ reflex to culture   3. Pain management. Plan to discuss once moved to inpatient L&D

## 2023-01-24 NOTE — PROGRESS NOTES
Pt is a  with SAM of 2/3/23, making her 38w3d. Pt presents to OB triage with reports of LOF since 0100 on  and contractions. Pt reports positive FM, no bleeding. FHM and toco on. V/S stable. NST reactive. Amnisure performed with positive result.    Dr. Hall at bedside to perform ultrasound, vtx presentation confirmed.

## 2023-01-24 NOTE — PROGRESS NOTES
22-year-old  1 para 0 at 38 weeks 4-day gestational age.  Gestation hypertension.  Undergoing induction of labor.  No cervical change for many hours.  Latest exam shows swollen cervix with arrest dilation approximately 6 to 7 cm.    Fetus now with elevated baseline 160 to 170 bpm moderate viability with occasional variable decelerations.  Suspicion of possible chorioamnionitis.    Discussed findings with patient and family.  At this time, recommended  secondary to arrest of dilation, likely secondary to OP position with asynclitism.    All questions answered.  Patient has agreed to undergo .  Team has been notified and will perform  soon as possible    Discussed with the patient the risks of  delivery. The risks include DVT/pulmonary embolism, pelvic scarring, pelvic pain, infection, bleeding, scarring, damage to other organs in the area of operation, anesthesia complications, and death. Specifically organs that can be damaged are bowel, bladder, ureters. I also discussed with the patient the risk of wound infection and wound breakdown. We discussed that these risks are greater in people that have diabetes or obesity. I also discussed the risk of emergency blood transfusion during procedure as well as emergency hysterectomy during procedure. Patient had the opportunity to ask questions regarding procedures. All questions answered to the patient's satisfaction. Pt agrees to proceed with surgery.

## 2023-01-24 NOTE — PROGRESS NOTES
01/24/23 6:09 AM    S: Pt is comfortable with epidural infusing. Discussed R/B/A of AROM of forebag, and placing internal monitors - pt agreeable to plan.    O:    Vitals:    01/24/23 0421 01/24/23 0442 01/24/23 0503 01/24/23 0525   BP: 116/55 (!) 153/96 (!) 140/80 136/76   Pulse: 87 88 76 75   Resp:       Temp:  37.1 °C (98.7 °F)     TempSrc:  Oral     SpO2:       Weight:       Height:               FHTs: Baseline 135 BPM, + accels, absent decels, moderate variability        [place]: Contractions q 2-3 minutes, pitocin @ 4 milliunits        SVE: 4/50/-2, AROM of forebag, IUPC and FSE placed     A/P:    IUP @ 38w4d   Cat 1 FHTs    Labor progress: early labor  Plan: Continue pitocin, recheck SVE when indicated  Consult Dr. Bhagat (attending MD) JONNY Lopes CNM

## 2023-01-24 NOTE — PROGRESS NOTES
"01/23/23 7:14 PM    S: Pt is sitting up in bed eating a sandwich. Accompanied by two support people who are at the bedside and supportive. Desiring an epidural after eating. No questions or concerns at this time.    O:    Vitals:    01/23/23 1547 01/23/23 1548   BP:  126/88   Pulse:  79   Resp:  18   Temp:  37.1 °C (98.8 °F)   TempSrc:  Oral   SpO2:  98%   Weight: 69.9 kg (154 lb)    Height: 1.549 m (5' 0.98\")            FHTs: Baseline 130 BPM, + accels, absent decels, moderate variability        [place]: Contractions q 2-3 minutes        SVE: 3/50/ballotable per Dr. Hall     A/P:    IUP @ 38w3d   Cat 1 FHTs    Labor progress: early labor, PROM  Plan: Continue expectant management, recheck SVE when indicated - consider Pitocin if ctx space out  Consult Dr. Bhagat (attending MD) JONNY Lopes CNM    "

## 2023-01-24 NOTE — H&P
OB H&P:    CC: Leakage of fluid    HPI:  Miriam Reyes Lopez is a 22 y.o.  @ 38w3d by LMP c/w 12 week U/S presenting for evaluation of LOF starting at 01:00. She reports a large loss of clear fluid at that time. She has had some mild to moderate contractions that are getting more frequent.     Contractions: Yes   Loss of fluid: Yes   Vaginal bleeding: No   Fetal movement: present      PNC with [facility] starting at 17 weeks.  Pregnancy c/b anemia, hgb 9.7 10/4/22 and 11/3/22    PNL:  Blood type O+, RI, HIV NR, treponemal antibody NR, HBsAg NR, HCV NRGC/CT neg/neg  GBS negative      ROS:  Const: denies fevers, general concerns  ENT: + congestion. Denies sore throat  CV: Denies CP or significant peripheral edema  Resp: Denies dyspnea, cough  GI: denies abd pain, GI concerns  : see HPI  Neuro: denies new HA or vision changes    OB History    Para Term  AB Living   1             SAB IAB Ectopic Molar Multiple Live Births                    # Outcome Date GA Lbr Femi/2nd Weight Sex Delivery Anes PTL Lv   1 Current                GYN: denies STIs, no cervical procedures    No past medical history on file.    No past surgical history on file.    No current facility-administered medications on file prior to encounter.     Current Outpatient Medications on File Prior to Encounter   Medication Sig Dispense Refill    nitrofurantoin (MACROBID) 100 MG Cap Take 1 Capsule by mouth 2 times a day. (Patient not taking: Reported on 2023) 10 Capsule 0    terconazole (TERAZOL 3) 0.8 % vaginal cream Use sparingly externally bid as directed in area affected for up to 1 week if needed (Patient not taking: Reported on 2023) 30 g 0    Prenatal MV-Min-Fe Fum-FA-DHA (PRENATAL 1 PO) Take  by mouth.         Family History   Problem Relation Age of Onset    No Known Problems Mother     No Known Problems Father     No Known Problems Sister     No Known Problems Brother     No Known Problems Brother        Social History  "    Socioeconomic History    Marital status: Single     Spouse name: Not on file    Number of children: Not on file    Years of education: Not on file    Highest education level: Not on file   Occupational History    Not on file   Tobacco Use    Smoking status: Never    Smokeless tobacco: Never   Vaping Use    Vaping Use: Never used   Substance and Sexual Activity    Alcohol use: Not Currently     Comment: occa    Drug use: Never    Sexual activity: Not Currently     Partners: Male     Birth control/protection: None   Other Topics Concern    Not on file   Social History Narrative    Not on file     Social Determinants of Health     Financial Resource Strain: Not on file   Food Insecurity: Not on file   Transportation Needs: Not on file   Physical Activity: Not on file   Stress: Not on file   Social Connections: Not on file   Intimate Partner Violence: Not on file   Housing Stability: Not on file       PE:  Vitals:    23 1547 23 1548   BP:  126/88   Pulse:  79   Resp:  18   Temp:  37.1 °C (98.8 °F)   TempSrc:  Oral   SpO2:  98%   Weight: 69.9 kg (154 lb)    Height: 1.549 m (5' 0.98\")      Gen: alert, conversant, no acute distress  CV: RRR, nl S1 and S2 without murmurs, cap refill <2 sec  Resp: Unlabored respirations, symmetric chest rise, CTAB  abd: soft, gravid, NT, EFW 3200 g  Ext: NT, no edema  Neuro: No gross focal deficits, sensation intact to light touch throughout    SVE: 3/50/-3 @ 17:45  FHT: 135/moderate variability/+ accels/ no decels  [place]: ctx Q2-3 minutes    Vertex presentation confirmed by BSUS in triage    A/P: 22 y.o.  @ 38w3d by LMP c/w 12 week U/S presenting following SROM in early labor.  SROM around 01:00 AM   GBS neg  Early labor  Desires epidural for pain control  Anemia- f/u starting hemoglobin  Monitor for signs of infection given prolonged rupture of membranes      Kalie Hall M.D.    "

## 2023-01-24 NOTE — ANESTHESIA PREPROCEDURE EVALUATION
Date: 23  Procedure: Labor Epidural         Relevant Problems   No relevant active problems       Physical Exam    Airway   Mallampati: I  TM distance: >3 FB  Neck ROM: full       Cardiovascular - normal exam     Dental - normal exam           Pulmonary   Breath sounds clear to auscultation     Abdominal    Neurological - normal exam                 Anesthesia Plan    ASA 2       Plan - epidural   Neuraxial block will be primary anesthetic      (Labor epidural converted to )            Pertinent diagnostic labs and testing reviewed    Informed Consent:    Anesthetic plan and risks discussed with patient.

## 2023-01-24 NOTE — PROGRESS NOTES
1900- report received from Colette LEON, pt is resting, VSS and FHT reassuring. POC discussed, pt given a snack prior to epidural placement   0700-report given to Montse LEON

## 2023-01-24 NOTE — PROGRESS NOTES
1808: Pt ambulatory from triage to S219.  ipad in room.   1830: Dr Hall at bedside for assessment and POC   1833: SVE 3/50/ballot per Dr. Hall. Pt ready for epidural anytime. Will start IV, draw labs and print consent forms. Will start bolus. Pt updated on process of epidural and all questions answered. Pitocin orders placed by Dr. Hall if needed.  1850: IV started, labs drawn and sent.  1900: Report given to CAROLYN Reece at bedside. POC discussed.

## 2023-01-24 NOTE — CARE PLAN
The patient is Stable - Low risk of patient condition declining or worsening         Progress made toward(s) clinical / shift goals:    Problem: Psychosocial - L&D  Goal: Patient will be able to discuss coping skills during hospitalization  Outcome: Progressing     Problem: Risk for Infection and Impaired Wound Healing  Goal: Patient will remain free from infection  Outcome: Progressing     Problem: Risk for Injury  Goal: Patient and fetus will be free of preventable injury/complications  Outcome: Progressing

## 2023-01-24 NOTE — PROGRESS NOTES
01/23/23 10:34 PM    S: Pt is comfortable with epidural infusing. With use of , discussed R/B/A of AROM of forebag and Pitocin - pt agreeable to plan.    O:    Vitals:    01/23/23 2111 01/23/23 2117 01/23/23 2122 01/23/23 2126   BP: 129/75 123/75 120/78 118/75   Pulse: (!) 106 (!) 109 (!) 103 (!) 104   Resp:       Temp:       TempSrc:       SpO2:       Weight:       Height:               FHTs: Baseline 130 BPM, + accels, absent decels, moderate variability        [place]: Contractions q 3-9 minutes, pitocin @ 2 milliunits        SVE: 3/50/-5, ballotable     A/P:    IUP @ 38w3d   Cat 1 FHTs    Labor progress: early labor, PROM  Plan: Continue Pitocin, recheck SVE when indicated, consider AROM of forebag when safe, frequent position changes and use of peanut ball  Consult Dr. Bhagat (attending MD) JONNY Lopes CNM

## 2023-01-24 NOTE — ED PROVIDER NOTES
OB ED EVALUATION NOTE    SUBJECTIVE:  Miriam Reyes Lopez is a 22 y.o.,  at 38w3d who presents for evaluation of LOF starting at 01:00 AM. She reports mild to moderate contractions. She denies vaginal bleeding. She states the baby is moving.     I personally reviewed the past medical and surgical histories, as well as the problem list.    OBJECTIVE:  Vital Signs:   Vitals:    23 1548   BP: 126/88   Pulse: 79   Resp: 18   Temp: 37.1 °C (98.8 °F)   SpO2: 98%     GEN: NAD  Abd: soft, NT, gravid  Ext: no edema      NST read: 135, moderate variability, +accels, no decels  [place]: ctx Q4-7 minutes    LABS / IMAGING:  Results for orders placed or performed during the hospital encounter of 23   AMNISURE ROM ASSAY   Result Value Ref Range    AmniSure ROM Positive (AA) Negative         ASSESSMENT AND PLAN:  22 y.o.  presenting at 38w3d by LMP c/w 12 week U/S for evaluation of ROM.  SROM confirmed  GBS neg  Admit to L&D, plan to augment with pitocin    Please see H&P for full details.    Patient Active Problem List    Diagnosis Date Noted    Labor and delivery, indication for care 2023    Cystitis 2022    Elevated 1hr  --> 3hr GTT wnl 2022    Anemia affecting pregnancy - 9.7/31.9 on 10/4 - stable 11/4 10/04/2022    Supervision of first pregnancy  2022         Kalie Hall M.D.

## 2023-01-24 NOTE — ANESTHESIA PROCEDURE NOTES
Epidural Block    Date/Time: 1/23/2023 8:01 PM  Performed by: Marlyn Brady M.D.  Authorized by: Marlyn Brady M.D.     Patient Location:  OB  Start Time:  1/23/2023 8:01 PM  Reason for Block: labor analgesia    patient identified, IV checked, site marked, risks and benefits discussed, surgical consent, monitors and equipment checked, pre-op evaluation and timeout performed    Patient Position:  Sitting  Prep: ChloraPrep, patient draped and sterile technique    Monitoring:  Blood pressure, continuous pulse oximetry and heart rate  Approach:  Midline  Location:  L3-L4  Injection Technique:  LILIANA saline  Skin infiltration:  Lidocaine  Strength:  1%  Dose:  3ml  Needle Type:  Tuohy  Needle Gauge:  17 G  Needle Length:  3.5 in  Loss of resistance::  4  Catheter Size:  19 G  Catheter at Skin Depth:  9  Test Dose Result:  Negative

## 2023-01-24 NOTE — PROGRESS NOTES
0700: Report received from Shanell LEON. EFM tracing, VSS. POC discussed with patient. FOB and sister of patient at bedside. Patient states she has a HA rated as 8/10. Denies vision changes/epigastric pain. Whiteboard updated. Call light at bedside, patient encouraged to call with needs or concerns. Bed in lowest and locked position. All questions answered.     0819: This RN to bedside. See flowsheets. SVE 5/90/-2.     0830: Dr. Avilez notified of EFM tracing, prolonged deceleration, and P/C ratio value.     0850: Dr. Avilez to bedside. SVE 6/100/-2. Orders to restart Pitocin 2x2.     0912: Dr Evans to bedside to bolus patient.    1057: Dr. Avilez notified of severe range BP.     1100: Dr Evans at bedside to bolus patient.    1210: Dr. Avilez notified of baseline change.    1230: SVE per MD. SVE by this RN 7/100/-2. Cervix found to be swollen. MD notified.    1409: SVE by Dr. Avilez 7/-2.     1445: Dr. Avilez to bedside. Decision for primary  section made.     1520: Dr. Avilez notified of baseline change, variability change, and decelerations.     1607: Delivery of viable infant male. APGARs 8/9.     1809: Patient transferred to PP unit with infant in arms, FOB at side. Cuddles alarm activated in elevator.    1830: Report to PP RN. Bands verified.

## 2023-01-25 LAB
ERYTHROCYTE [DISTWIDTH] IN BLOOD BY AUTOMATED COUNT: 50.4 FL (ref 35.9–50)
HCT VFR BLD AUTO: 24.5 % (ref 37–47)
HGB BLD-MCNC: 7.5 G/DL (ref 12–16)
MCH RBC QN AUTO: 21.5 PG (ref 27–33)
MCHC RBC AUTO-ENTMCNC: 30.6 G/DL (ref 33.6–35)
MCV RBC AUTO: 70.2 FL (ref 81.4–97.8)
PLATELET # BLD AUTO: 170 K/UL (ref 164–446)
PMV BLD AUTO: 11.2 FL (ref 9–12.9)
RBC # BLD AUTO: 3.49 M/UL (ref 4.2–5.4)
WBC # BLD AUTO: 27 K/UL (ref 4.8–10.8)

## 2023-01-25 PROCEDURE — 85027 COMPLETE CBC AUTOMATED: CPT

## 2023-01-25 PROCEDURE — A9270 NON-COVERED ITEM OR SERVICE: HCPCS | Performed by: NURSE PRACTITIONER

## 2023-01-25 PROCEDURE — 700102 HCHG RX REV CODE 250 W/ 637 OVERRIDE(OP): Performed by: NURSE PRACTITIONER

## 2023-01-25 PROCEDURE — 700105 HCHG RX REV CODE 258

## 2023-01-25 PROCEDURE — 700111 HCHG RX REV CODE 636 W/ 250 OVERRIDE (IP): Performed by: ANESTHESIOLOGY

## 2023-01-25 PROCEDURE — 700102 HCHG RX REV CODE 250 W/ 637 OVERRIDE(OP)

## 2023-01-25 PROCEDURE — A9270 NON-COVERED ITEM OR SERVICE: HCPCS | Performed by: ANESTHESIOLOGY

## 2023-01-25 PROCEDURE — 36415 COLL VENOUS BLD VENIPUNCTURE: CPT

## 2023-01-25 PROCEDURE — A9270 NON-COVERED ITEM OR SERVICE: HCPCS

## 2023-01-25 PROCEDURE — 770002 HCHG ROOM/CARE - OB PRIVATE (112)

## 2023-01-25 PROCEDURE — 700102 HCHG RX REV CODE 250 W/ 637 OVERRIDE(OP): Performed by: ANESTHESIOLOGY

## 2023-01-25 RX ORDER — FERROUS SULFATE 325(65) MG
325 TABLET ORAL
Status: DISCONTINUED | OUTPATIENT
Start: 2023-01-25 | End: 2023-01-26 | Stop reason: HOSPADM

## 2023-01-25 RX ORDER — IBUPROFEN 800 MG/1
800 TABLET ORAL EVERY 8 HOURS PRN
Status: DISCONTINUED | OUTPATIENT
Start: 2023-01-28 | End: 2023-01-26 | Stop reason: HOSPADM

## 2023-01-25 RX ORDER — IBUPROFEN 800 MG/1
800 TABLET ORAL EVERY 8 HOURS
Status: DISCONTINUED | OUTPATIENT
Start: 2023-01-25 | End: 2023-01-26 | Stop reason: HOSPADM

## 2023-01-25 RX ADMIN — ACETAMINOPHEN 1000 MG: 500 TABLET ORAL at 02:35

## 2023-01-25 RX ADMIN — ACETAMINOPHEN 1000 MG: 500 TABLET ORAL at 16:05

## 2023-01-25 RX ADMIN — FERROUS SULFATE TAB 325 MG (65 MG ELEMENTAL FE) 325 MG: 325 (65 FE) TAB at 10:29

## 2023-01-25 RX ADMIN — OXYCODONE HYDROCHLORIDE 5 MG: 5 TABLET ORAL at 20:21

## 2023-01-25 RX ADMIN — DOCUSATE SODIUM 100 MG: 100 CAPSULE, LIQUID FILLED ORAL at 22:16

## 2023-01-25 RX ADMIN — ACETAMINOPHEN 1000 MG: 500 TABLET ORAL at 22:15

## 2023-01-25 RX ADMIN — IBUPROFEN 800 MG: 800 TABLET, FILM COATED ORAL at 12:06

## 2023-01-25 RX ADMIN — IBUPROFEN 800 MG: 800 TABLET, FILM COATED ORAL at 20:22

## 2023-01-25 RX ADMIN — OXYCODONE HYDROCHLORIDE 5 MG: 5 TABLET ORAL at 15:42

## 2023-01-25 RX ADMIN — Medication 1 LOZENGE: at 23:38

## 2023-01-25 RX ADMIN — ACETAMINOPHEN 1000 MG: 500 TABLET ORAL at 10:29

## 2023-01-25 RX ADMIN — KETOROLAC TROMETHAMINE 30 MG: 30 INJECTION, SOLUTION INTRAMUSCULAR; INTRAVENOUS at 05:43

## 2023-01-25 RX ADMIN — SODIUM CHLORIDE, POTASSIUM CHLORIDE, SODIUM LACTATE AND CALCIUM CHLORIDE: 600; 310; 30; 20 INJECTION, SOLUTION INTRAVENOUS at 01:27

## 2023-01-25 ASSESSMENT — PAIN DESCRIPTION - PAIN TYPE
TYPE: ACUTE PAIN
TYPE: ACUTE PAIN;SURGICAL PAIN
TYPE: ACUTE PAIN
TYPE: ACUTE PAIN

## 2023-01-25 NOTE — ANESTHESIA TIME REPORT
Anesthesia Start and Stop Event Times     Date Time Event    1/23/2023 1950 Ready for Procedure     1950 Anesthesia Start    1/24/2023 1652 Anesthesia Stop        Responsible Staff  01/23/23 to 01/24/23    Name Role Begin End    Marlyn Brady M.D. Anesth 1950 0658    lBayne Evans M.D. Anesth 0658 1652        Overtime Reason:  no overtime (within assigned shift)    Comments:

## 2023-01-25 NOTE — PROGRESS NOTES
Report received from Montse LEON. Patient is awake and alert. Assumed care. Assessment done. Discussed plan of care with the help of Meredith CÁRDENAS which include; safe sleeping, the use of suction bulb, emergency cord and call light. She did well in using the incentive spirometer. They are aware of the visiting hours.

## 2023-01-25 NOTE — PROGRESS NOTES
Report received from Citlalli LEON. Pt assessment complete. Reviewed POC for tonight including ambulation, hourly checks, and pain medication. Call light is within reach.     0600: cordon catheter removed. Pt aware she has 6 hours to try to void.

## 2023-01-25 NOTE — PROGRESS NOTES
Pt IV infiltrated. Spoke with Dr. Pimentel about this. Verbal order received to discontinue Toradol and start pt on Motrin. Also informed provider of pt H/H. Iron to be started today with breakfast.  Pt has been up to ambulate. Pt has had no c/o headache or dizziness.

## 2023-01-25 NOTE — CARE PLAN
Problem: Altered Physiologic Condition  Goal: Patient physiologically stable as evidenced by normal lochia, palpable uterine involution and vitals within normal limits  Outcome: Progressing     Problem: Early Mobilization - Post Surgery  Goal: Early mobilization post surgery  Outcome: Progressing   The patient is Stable - Low risk of patient condition declining or worsening    Shift Goals  Clinical Goals: pain control; ambulate  Patient Goals: pain control  Family Goals: support pt    Progress made toward(s) clinical / shift goals:  pt lochia has been light rubra. Pt has gotten up to ambulate. She walked with a steady gait using stand by assistance. Shannon care performed. Pt walked back to bed independently. Pt had no complaint of headache, dizziness, or nausea.     Patient is not progressing towards the following goals:

## 2023-01-25 NOTE — PROGRESS NOTES
Obstetrics & Gynecology Post-Partum Progress Note    Date of Service    [unfilled] is a 22 y.o.  on POD#1 , s/p  for secondary to arrest of dilation, likely 2/2 OP position with asynclitism.  Delivery date: 2023 @ 1607      Subjective  Patient reports  overall feeling well. Pain is tolerable with PO pain medication.  Patient is ambulating, cordon just removed this morning, has not voided spontaneously, and is yet to pass flatus or have bowel movements.  She is tolerating PO.  Reports her bleeding is less than a period.    Breast feeding.: yes, reports this is going well.    Pain: Well controlled  Bleeding: lochia minimal  Tolerating PO: yes  Voiding: cordon discontinued, awaiting spontaneous void  Ambulating: yes  Passing flatus: No  Feeding: breastfeeding well      Objective  Vitals:    23 0200 23 0300 23 0400 23 0600   BP: 116/63   104/63   Pulse: 67 77 65 64   Resp: 16 16 16 16   Temp: 36.7 °C (98.1 °F)   36.6 °C (97.8 °F)   TempSrc: Temporal   Temporal   SpO2: 95% 95% 95% 95%   Weight:       Height:         Physical Exam  Gen: alert, well appearing, NAD, conversant, resting comfortably in bed   CV: rrr, no m/r/g  Resp: unlabored, symmetric chest rise, clear to auscultation bilaterally  Abd: soft, +BS, ND, appropriately tender to palpation, no rebound/guarding   Fundus: palpable firm  Incision: dressing clean, dry, intact, no significant drainage, no dehiscence, and no significant erythema  Ext: symmetric,  no edema bilaterally, calves nontender      Labs:  Recent Labs     23  1850 23  0441 23  0252   WBC 10.2 10.2 27.0*   RBC 4.77 4.38 3.49*   HEMOGLOBIN 10.0* 9.0* 7.5*   HEMATOCRIT 33.4* 30.2* 24.5*   MCV 70.0* 68.9* 70.2*   MCH 21.0* 20.5* 21.5*   RDW 50.5* 49.6 50.4*   PLATELETCT 212 176 170   MPV  --   --  11.2   NEUTSPOLYS 90.40*  --   --    LYMPHOCYTES 9.60*  --   --    MONOCYTES 0.00  --   --    EOSINOPHILS 0.00  --   --    BASOPHILS 0.00  --    --    RBCMORPHOLO Present  --   --      Recent Labs     23  0441   SODIUM 139   POTASSIUM 4.3   CHLORIDE 109   CO2 17*   GLUCOSE 70   BUN 9     Recent Labs     23  0441   ALBUMIN 2.6*   TBILIRUBIN 0.4   ALKPHOSPHAT 216*   TOTPROTEIN 5.9*   ALTSGPT 14   ASTSGOT 29   CREATININE 0.91         Medications  ferrous sulfate, 325 mg, Oral, QDAY with Breakfast  ibuprofen, 800 mg, Oral, Q8HRS  acetaminophen, 1,000 mg, Oral, Q6HR  acetaminophen, 1,000 mg, Oral, Q6HRS      PRN medications: ibuprofen **FOLLOWED BY** [START ON 2023] ibuprofen, oxyCODONE immediate-release, oxyCODONE immediate release, HYDROmorphone, HYDROmorphone, ePHEDrine, ondansetron, diphenhydrAMINE, diphenhydrAMINE **OR** diphenhydrAMINE **OR** naloxone (NARCAN) 20 mg/500mL infusion, LR, acetaminophen **FOLLOWED BY** [START ON 2023] acetaminophen, oxyCODONE immediate-release, oxyCODONE immediate release, ondansetron **OR** ondansetron, diphenhydrAMINE **OR** diphenhydrAMINE, docusate sodium      Assessment/Plan  Miriam Reyes Lopez is a 22 y.o.yo  POD#1  s/p  for secondary to arrest of dilation, likely 2/2 OP position with asynclitism.    - Post care:   Doing well postpartum. Yet to meet all postop milestones    - Encourage ambulation  - Continue routine postop care  - Pain: controlled   IV access infiltrated. Patient transitioned from IV Ketorolac to oral Ibuprofen  - Rh+, Rubella Immune  - Method of Feeding: plans to breastfeed  - Method of Contraception: NA  - VTE PPX: SCDs    Disposition: anticipate discharge likely home on POD#1       Mario Pimentel M.D., PGY-1  [facility]

## 2023-01-25 NOTE — ANESTHESIA POSTPROCEDURE EVALUATION
Patient: Miriam Reyes Lopez    Procedure Summary     Date: 23 Room / Location: [facility] OR 01 / SURGERY LABOR AND DELIVERY    Anesthesia Start:  Anesthesia Stop: 23    Procedure:  SECTION, PRIMARY (Abdomen) Diagnosis:       Status post primary low transverse  section      (Same delivery, delivered)    Surgeons: Kb Avilez M.D. Responsible Provider: Blayne Evans M.D.    Anesthesia Type: epidural ASA Status: 2          Final Anesthesia Type: epidural  Last vitals  BP   Blood Pressure: (!) 144/86 (RN notified)    Temp   37.7 °C (99.8 °F) (RN notified)    Pulse   82   Resp   16    SpO2   95 %      Anesthesia Post Evaluation    Patient location during evaluation: PACU  Patient participation: complete - patient participated  Level of consciousness: awake and alert  Pain score: 1    Airway patency: patent  Anesthetic complications: no  Cardiovascular status: hemodynamically stable  Respiratory status: acceptable  Hydration status: euvolemic    PONV: none          No notable events documented.     Nurse Pain Score: 1 (NPRS)

## 2023-01-25 NOTE — CARE PLAN
The patient is Stable - Low risk of patient condition declining or worsening    Shift Goals  Clinical Goals: labor progress  Patient Goals: pain control  Family Goals: support pt      Problem: Knowledge Deficit - L&D  Goal: Patient and family/caregivers will demonstrate understanding of plan of care, disease process/condition, diagnostic tests and medications  Outcome: Progressing     Problem: Risk for Excess Fluid Volume  Goal: Patient will demonstrate pulse, blood pressure and neurologic signs within expected ranges and without any respiratory complications  Outcome: Progressing     Problem: Psychosocial - L&D  Goal: Patient's level of anxiety will decrease  Outcome: Progressing  Goal: Patient will be able to discuss coping skills during hospitalization  Outcome: Progressing  Goal: Patient's ability to re-evaluate and adapt role responsibilities will improve  Outcome: Progressing  Goal: Spiritual and cultural needs incorporated into hospitalization  Outcome: Progressing     Problem: Risk for Venous Thromboembolism (VTE)  Goal: VTE prevention measures will be implemented and patient will remain free from VTE  Outcome: Progressing     Problem: Risk for Infection and Impaired Wound Healing  Goal: Patient will remain free from infection  Outcome: Progressing  Goal: Patient's wound/surgical incision will decrease in size and heals properly  Outcome: Progressing     Problem: Risk for Fluid Imbalance  Goal: Patient's fluid volume balance will be maintained or improve  Outcome: Progressing     Problem: Risk for Injury  Goal: Patient and fetus will be free of preventable injury/complications  Outcome: Progressing  Note: EFM tracing.       Problem: Pain  Goal: Patient's pain will be alleviated or reduced to the patient’s comfort goal  Outcome: Progressing     Problem: Discharge Barriers/Planning  Goal: Patient's continuum of care needs are met  Outcome: Progressing     Problem: Skin Integrity  Goal: Skin integrity is  maintained or improved  Outcome: Progressing     Problem: Pain - Standard  Goal: Alleviation of pain or a reduction in pain to the patient’s comfort goal  Outcome: Progressing     Problem: Knowledge Deficit - Standard  Goal: Patient and family/care givers will demonstrate understanding of plan of care, disease process/condition, diagnostic tests and medications  Outcome: Progressing

## 2023-01-26 ENCOUNTER — PHARMACY VISIT (OUTPATIENT)
Dept: PHARMACY | Facility: MEDICAL CENTER | Age: 23
End: 2023-01-26
Payer: MEDICARE

## 2023-01-26 VITALS
RESPIRATION RATE: 18 BRPM | DIASTOLIC BLOOD PRESSURE: 56 MMHG | BODY MASS INDEX: 29.07 KG/M2 | HEIGHT: 61 IN | WEIGHT: 154 LBS | HEART RATE: 63 BPM | OXYGEN SATURATION: 98 % | TEMPERATURE: 97.4 F | SYSTOLIC BLOOD PRESSURE: 110 MMHG

## 2023-01-26 PROCEDURE — A9270 NON-COVERED ITEM OR SERVICE: HCPCS

## 2023-01-26 PROCEDURE — 700102 HCHG RX REV CODE 250 W/ 637 OVERRIDE(OP)

## 2023-01-26 PROCEDURE — RXMED WILLOW AMBULATORY MEDICATION CHARGE: Performed by: NURSE PRACTITIONER

## 2023-01-26 RX ORDER — ACETAMINOPHEN 500 MG
1000 TABLET ORAL EVERY 6 HOURS
Qty: 30 TABLET | Refills: 0 | Status: SHIPPED | OUTPATIENT
Start: 2023-01-26 | End: 2023-05-10 | Stop reason: CLARIF

## 2023-01-26 RX ORDER — PSEUDOEPHEDRINE HCL 30 MG
100 TABLET ORAL 2 TIMES DAILY PRN
Qty: 60 CAPSULE | Refills: 0 | Status: SHIPPED | OUTPATIENT
Start: 2023-01-26 | End: 2023-05-10 | Stop reason: CLARIF

## 2023-01-26 RX ORDER — IBUPROFEN 800 MG/1
800 TABLET ORAL EVERY 8 HOURS PRN
Qty: 30 TABLET | Refills: 0 | Status: SHIPPED | OUTPATIENT
Start: 2023-01-26 | End: 2023-05-10 | Stop reason: CLARIF

## 2023-01-26 RX ORDER — FERROUS SULFATE 325(65) MG
325 TABLET ORAL
Qty: 30 TABLET | Refills: 5 | Status: SHIPPED | OUTPATIENT
Start: 2023-01-26 | End: 2023-05-10 | Stop reason: CLARIF

## 2023-01-26 RX ADMIN — FERROUS SULFATE TAB 325 MG (65 MG ELEMENTAL FE) 325 MG: 325 (65 FE) TAB at 08:45

## 2023-01-26 RX ADMIN — Medication 1 LOZENGE: at 10:37

## 2023-01-26 RX ADMIN — ACETAMINOPHEN 1000 MG: 500 TABLET ORAL at 10:36

## 2023-01-26 RX ADMIN — Medication 1 LOZENGE: at 03:57

## 2023-01-26 RX ADMIN — IBUPROFEN 800 MG: 800 TABLET, FILM COATED ORAL at 03:56

## 2023-01-26 RX ADMIN — IBUPROFEN 800 MG: 800 TABLET, FILM COATED ORAL at 12:13

## 2023-01-26 RX ADMIN — ACETAMINOPHEN 1000 MG: 500 TABLET ORAL at 03:56

## 2023-01-26 ASSESSMENT — PAIN DESCRIPTION - PAIN TYPE
TYPE: ACUTE PAIN

## 2023-01-26 NOTE — CARE PLAN
The patient is Stable - Low risk of patient condition declining or worsening    Shift Goals  Clinical Goals: (P) VS stable; Pain control  Patient Goals: (P) pain control  Family Goals: support, bond    Progress made toward(s) clinical / shift goals:    Problem: Psychosocial - Postpartum  Goal: Patient will verbalize and demonstrate effective bonding and parenting behavior  Outcome: Progressing  Note: Patient verbalizes and demonstrates effective bonding and parenting behavior evidenced by asking appropriate questions and verifying feed times and amounts.      Problem: Altered Physiologic Condition  Goal: Patient physiologically stable as evidenced by normal lochia, palpable uterine involution and vitals within normal limits  Outcome: Progressing  Note: Patient is physiologically stable as evidenced by normal lochia, firm fundus, and vitals WNL.      Problem: Infection - Postpartum  Goal: Postpartum patient will be free of signs and symptoms of infection  Outcome: Progressing  Note: Patient is free of signs/symptoms of infection.  Pain is controlled per MAR and temperature is WNL.        Patient is not progressing towards the following goals:

## 2023-01-26 NOTE — PROGRESS NOTES
1900 Assumed care of patient at change of shift.  Discussed plan of care with patient and answered all questions.      2030 Assessment completed.  Fundus firm, lochia light.  Abdominal incision with dry gauze and transparent film dressing intact. Discussed paced feedings and supplemental information already in the room.   Communicated with patients via EARL's  merissa on his personal cell phone per patients request.

## 2023-01-26 NOTE — DISCHARGE INSTRUCTIONS

## 2023-01-26 NOTE — CARE PLAN
The patient is Stable - Low risk of patient condition declining or worsening    Shift Goals  Clinical Goals: Pain management    Progress made toward(s) clinical / shift goals:      Problem: Psychosocial - Postpartum  Goal: Patient will verbalize and demonstrate effective bonding and parenting behavior  Outcome: Progressing  Pt interacting with infant appropriately and bottle feeding independently.    Problem: Altered Physiologic Condition  Goal: Patient physiologically stable as evidenced by normal lochia, palpable uterine involution and vitals within normal limits  Outcome: Progressing  Pt maintaining firm fundus and lochia WDL.    Patient is not progressing towards the following goals:

## 2023-01-26 NOTE — PROGRESS NOTES
Assumed care. Assessment completed. Fundus firm, lochia scant. Abdominal incision with dressing intact. Plan of care reviewed. Pt will call if med intervention needed. Call light within reach.

## 2023-01-26 NOTE — PROGRESS NOTES
Meds-to-Beds: Discharge prescription orders listed below delivered to patient's bedside. RN Brian notified. Patient counseled.      Current Outpatient Medications   Medication Sig Dispense Refill    docusate sodium 100 MG Cap Take 1 capsule by mouth 2 times a day as needed for Constipation. 60 Capsule 0    ferrous sulfate 325 (65 Fe) MG tablet Take 1 Tablet by mouth every morning with breakfast. 30 Tablet 5    acetaminophen (TYLENOL) 500 MG Tab Take 2 Tablets by mouth every 6 hours. 30 Tablet 0    ibuprofen (MOTRIN) 800 MG Tab Take 1 Tablet by mouth every 8 hours as needed for Moderate Pain. 30 Tablet 0      Jim Davis, Pharmacy Intern

## 2023-01-26 NOTE — DISCHARGE SUMMARY
Discharge Summary:      Miriam Reyes Lopez    Admit Date:   2023  Discharge Date:  2023     Admitting diagnosis:  Labor and delivery, indication for care [O75.9]  Discharge Diagnosis: Status post proimary  for failure to progress.  Pregnancy Complications: anemia,   Tubal Ligation:  no        History:  No past medical history on file.  OB History    Para Term  AB Living   1 1 1     1   SAB IAB Ectopic Molar Multiple Live Births           0 1      # Outcome Date GA Lbr Femi/2nd Weight Sex Delivery Anes PTL Lv   1 Term 23 38w4d  3.22 kg (7 lb 1.6 oz) M CS-LTranv EPI N RUDDY      Complications: Failure to Progress in First Stage        Patient has no known allergies.  Patient Active Problem List    Diagnosis Date Noted    Gestational hypertension, third trimester 2023    Labor and delivery, indication for care 2023    Cystitis 2022    Elevated 1hr  --> 3hr GTT wnl 2022    Anemia affecting pregnancy - 9.7/31.9 on 10/4 - stable 11/4 10/04/2022    Supervision of first pregnancy  2022        Hospital Course:   22 y.o. , now para 1, was admitted with the above mentioned diagnosis, underwent Primary  failure to progress,  for failure to progress. Patient postpartum course was unremarkable, with progressive advancement in diet , ambulation and toleration of oral analgesia. Patient without complaints today and desires discharge.      Vitals:    23 1000 23 1400 23 1800 23 0553   BP: 109/65 97/63 117/71 110/56   Pulse: 61 (!) 59 63 63   Resp: 16 16 18 18   Temp: 36.4 °C (97.5 °F) 36.6 °C (97.9 °F) 36.2 °C (97.2 °F) 36.3 °C (97.4 °F)   TempSrc: Temporal Temporal Temporal Temporal   SpO2: 97% 98% 99% 98%   Weight:       Height:           Current Facility-Administered Medications   Medication Dose    ferrous sulfate tablet 325 mg  325 mg    ibuprofen (MOTRIN) tablet 800 mg  800 mg    Followed by    [START ON  1/28/2023] ibuprofen (MOTRIN) tablet 800 mg  800 mg    menthol ([place]) lozenge 1 Lozenge  1 Lozenge    lactated ringers infusion      acetaminophen (TYLENOL) tablet 1,000 mg  1,000 mg    Followed by    [START ON 1/28/2023] acetaminophen (TYLENOL) tablet 1,000 mg  1,000 mg    oxyCODONE immediate-release (ROXICODONE) tablet 5 mg  5 mg    oxyCODONE immediate release (ROXICODONE) tablet 10 mg  10 mg    ondansetron (ZOFRAN) syringe/vial injection 4 mg  4 mg    Or    ondansetron (ZOFRAN ODT) dispertab 4 mg  4 mg    diphenhydrAMINE (BENADRYL) tablet/capsule 25 mg  25 mg    Or    diphenhydrAMINE (BENADRYL) injection 25 mg  25 mg    docusate sodium (COLACE) capsule 100 mg  100 mg    oxytocin (PITOCIN) infusion (for post delivery)  125 mL/hr       Exam:  Breast Exam: negative  Abdomen: Abdomen soft, non-tender. BS normal. No masses,  No organomegaly  Fundus Non Tender: yes  Incision: dry and intact  Perineum: perineum intact  Extremity: extremities, peripheral pulses and reflexes normal     Labs:  Recent Labs     01/23/23  1850 01/24/23  0441 01/25/23  0252   WBC 10.2 10.2 27.0*   RBC 4.77 4.38 3.49*   HEMOGLOBIN 10.0* 9.0* 7.5*   HEMATOCRIT 33.4* 30.2* 24.5*   MCV 70.0* 68.9* 70.2*   MCH 21.0* 20.5* 21.5*   MCHC 29.9* 29.8* 30.6*   RDW 50.5* 49.6 50.4*   PLATELETCT 212 176 170   MPV  --   --  11.2        Activity:   Discharge to home  Pelvic Rest x 6 weeks    Assessment:  normal postpartum course  Discharge Assessment: No areas of skin breakdown/redness; surgical incision intact/healing, Taking in adequate diet and fluids, no heavy bleeding or foul smelling discharge, no redness or severe pain of breasts, voiding without difficulty      Follow up: [facility] or [facility] in 5 weeks for vaginal ; 1 week for incision check.   Pt need to call or go to ED for any of the following:  Fever over 100.5  Severe abd pain  Severe pain or swelling or drainage of laceration or incision site  Red streaks or painful masses in the  breasts  Foul smelling d/c or lochia  Heavy vaginal bleeding saturating a pad per hour  Sxs of PP depression  Severe headache  Visual changes      Discharge Meds:   Current Outpatient Medications   Medication Sig Dispense Refill    docusate sodium 100 MG Cap Take 100 mg by mouth 2 times a day as needed for Constipation. 60 Capsule 0    ferrous sulfate 325 (65 Fe) MG tablet Take 1 Tablet by mouth every morning with breakfast. 30 Tablet 5    acetaminophen (TYLENOL) 500 MG Tab Take 2 Tablets by mouth every 6 hours. 30 Tablet 0    ibuprofen (MOTRIN) 800 MG Tab Take 1 Tablet by mouth every 8 hours as needed for Moderate Pain. 30 Tablet 0       RENETTA Montoya.

## 2023-01-26 NOTE — PROGRESS NOTES
1445 - Discharge paperwork for pt and infant discussed with parents at bedside with Acacia RN in [place]. All questions answered. Follow up appointment to be made by patient. NBS #2 dates given to parents. Parents verbalize understanding. Paperwork signed and dated at this time.     1545 - Infant discharged in car seat with parents. Infant placed in car seat by parents and checked by RN. Bands verified. Cuddles removed.

## 2023-01-30 ENCOUNTER — POST PARTUM (OUTPATIENT)
Dept: OBGYN | Facility: CLINIC | Age: 23
End: 2023-01-30
Payer: COMMERCIAL

## 2023-01-30 ENCOUNTER — TELEPHONE (OUTPATIENT)
Dept: OBGYN | Facility: CLINIC | Age: 23
End: 2023-01-30

## 2023-01-30 VITALS — BODY MASS INDEX: 26.66 KG/M2 | DIASTOLIC BLOOD PRESSURE: 68 MMHG | WEIGHT: 141 LBS | SYSTOLIC BLOOD PRESSURE: 116 MMHG

## 2023-01-30 PROCEDURE — 99024 POSTOP FOLLOW-UP VISIT: CPT | Performed by: STUDENT IN AN ORGANIZED HEALTH CARE EDUCATION/TRAINING PROGRAM

## 2023-01-30 RX ORDER — SULFAMETHOXAZOLE AND TRIMETHOPRIM 800; 160 MG/1; MG/1
1 TABLET ORAL 2 TIMES DAILY
Qty: 14 TABLET | Refills: 0 | Status: SHIPPED | OUTPATIENT
Start: 2023-01-30 | End: 2023-02-06

## 2023-01-30 ASSESSMENT — FIBROSIS 4 INDEX: FIB4 SCORE: 1.09

## 2023-01-30 NOTE — PROGRESS NOTES
Pt. here for C/S check delivered on   Currently : bottle feeding  Pt. StatesL redness and tenderness on left side incision  Post partum visit on 1/20/23  Pharmacy verified  [phone]

## 2023-01-30 NOTE — PROGRESS NOTES
Incision Check     CC: Incision Check     HPI: 22 y.o.  s/p primary low transverse  delivery on 2023 with Dr. Avilez presents today for incision check.   Reports mild pain at incision site, worse at left apex. Started to have redness and swelling in that area today.  Denies fevers/chills.  Denies incisional drainage.  Normal lochia.    Is bottle feeding     Denies concerns about mood.      Vitals:    23 1428   BP: 116/68      Gen: AAO, NAD  Abd: soft, NT, ND,   Incision C/D/I, healing well other than erythema with swelling (approx 1-2cm) and tenderness at left apex of incision; no active drainage or dehiscence.      A/P: 22 y.o.  s/p LTCS with possible hematoma or seroma vs superficial wound infection  - possible seroma/hematoma vs superficial infection; prescribed 7 day course of Bactrim DS today.  - no signs of PP depression     RTC in 1 week for repeat wound check and for routine postpartum at 6wks PP    Sherwin Gil D.O.

## 2023-01-30 NOTE — TELEPHONE ENCOUNTER
Pt called stating delivered on 1/24/23 and is very concerned of her C/S incision pt states may have an infection as right side of incision is extremely swollen and painful and would like to be seen     Pt offered an appt this afternoon at 2:30pm     Pt agreed to come in to be seen

## 2023-02-07 NOTE — OR SURGEON
Immediate Post OP Note    PreOp Diagnosis: Intrauterine pregnancy at 38 weeks gestation, arrest dilation, gestational hypertension      PostOp Diagnosis: Same      Procedure(s):   SECTION, PRIMARY - Wound Class: Clean Contaminated    Surgeon(s):  Kb Avilez M.D.    Anesthesiologist/Type of Anesthesia:  Anesthesiologist: Marlyn Brady M.D.; Blayne Evans M.D./Spinal    Surgical Staff:  Circulator: Montse Roque R.N.  Scrub Person: Shanna Bartholomew  L&D Baby  Nurse: Judy Porter R.N.    Specimens removed if any:  * No specimens in log *    Estimated Blood Loss: 800 mL    Findings: Male infant, Apgars of 8 and 9, weight 3220 g, normal placenta with three-vessel cord    Complications: None        2023 6:46 AM Kb Avilez M.D.

## 2023-02-07 NOTE — OP REPORT
DATE OF SERVICE: 2023     PREOPERATIVE DIAGNOSES:  1.  Intrauterine pregnancy at 38w4d  2.  Arrest of dilation     POSTOPERATIVE DIAGNOSES:  1.  Intrauterine pregnancy at 38w4d  2.  same    PROCEDURE PERFORMED: Primary low transverse  section.     SURGEON:  Kb Avilez MD     ASSISTANT: PALOMO Pimentel MD     ANESTHESIA:  Spinal.     ANESTHESIOLOGIST: Blayne Evans MD     SPECIMEN: None     ESTIMATED BLOOD LOSS: 800 mL     FINDINGS:  A viable male infant, weight 3220 g, Apgars of 8 and 9 in vertex    presentation with clear amniotic fluid.  There was a normal uterus,   tubes, and ovaries bilaterally.     COMPLICATIONS:  None.     PROCEDURE:  After appropriate consents were obtained, the patient was taken to the operating room where spinal  anesthesia was applied without complications.  The patient was then prepped and draped in the usual sterile manner.  Clamp test on the skin verified adequate anesthesia.  A Pfannenstiel incision was made with a scalpel 3cm superior to the pubic symphysis and extended down to the rectus fascia.  The rectus fascia was incised with the scalpel and tented up. The underlying rectus muscle was  from the fascia first inferiorly and then superiorly using the major scissors.  The rectus muscle was  bluntly in the midline.  The peritoneum was entered bluntly in the midline. The peritoneum incision was extended superiorly and inferiorly with the Metzenbaum scissors with great care to avoid injury to underlying bowel or bladder.  Jan retractor was placed. The vesicouterine peritoneum was tented up and entered with Metzenbaum scissors, and a bladder flap was created.  An incision was made into the lower uterine segment transversely and the incision was extended bluntly.  Amniotomy was performed and there was noted to be clear amniotic fluid. The Infant's head was grasped and delivered atraumatically followed by the remainder of the body without any  complications.  The mouth and nares were suctioned. The cord was doubly clamped and cut and the infant was handed off to awaiting neonatology team.  The placenta was then allowed to spontaneously deliver. The uterus was cleared of clots and debris.  The hysterotomy incision was reapproximated with 1-0 Vicryl suture in a running locked fashion. A second layer of the same suture was placed to imbricate the incision creating a 2 layer closure.  Hemostasis was noted.  The tubes and ovaries were examined and noted to be normal.  The pelvis was irrigated with normal saline. The pericolic gutters were examined and any blood clots were removed.  The Jan retractor was removed. The peritoneum was reapproximated with 3-0 Vicryl suture running.  The rectus muscles were examined and hemostatic.  The fascia was reapproximated with 0 Vicryl suture running.  The subcutaneous fat was irrigated and any small bleeders were bovied for hemostasis.  The subcutaneous fat was then reapproximated with 3-0 Vicryl suture running.  The skin was reapproximated with 4-0 Monocryl suture running. Steri strips and a dressing were placed.  Sponge, needle, instrument, and lap counts were correct x2.  Patient tolerated the procedure well and went to recovery room in stable condition.        ____________________________________  Kb Avilez MD

## 2023-02-08 ENCOUNTER — GYNECOLOGY VISIT (OUTPATIENT)
Dept: OBGYN | Facility: CLINIC | Age: 23
End: 2023-02-08
Payer: COMMERCIAL

## 2023-02-08 VITALS — WEIGHT: 134 LBS | BODY MASS INDEX: 25.33 KG/M2 | SYSTOLIC BLOOD PRESSURE: 122 MMHG | DIASTOLIC BLOOD PRESSURE: 76 MMHG

## 2023-02-08 DIAGNOSIS — Z48.89 ENCOUNTER FOR POSTOPERATIVE WOUND CHECK: ICD-10-CM

## 2023-02-08 PROCEDURE — 99024 POSTOP FOLLOW-UP VISIT: CPT | Performed by: STUDENT IN AN ORGANIZED HEALTH CARE EDUCATION/TRAINING PROGRAM

## 2023-02-08 RX ORDER — ACETAMINOPHEN 325 MG/1
650 TABLET ORAL EVERY 6 HOURS PRN
Qty: 30 TABLET | Refills: 1 | Status: SHIPPED | OUTPATIENT
Start: 2023-02-08 | End: 2023-05-10 | Stop reason: CLARIF

## 2023-02-08 RX ORDER — IBUPROFEN 600 MG/1
600 TABLET ORAL EVERY 6 HOURS PRN
Qty: 30 TABLET | Refills: 1 | Status: SHIPPED | OUTPATIENT
Start: 2023-02-08 | End: 2023-05-10 | Stop reason: CLARIF

## 2023-02-08 ASSESSMENT — FIBROSIS 4 INDEX: FIB4 SCORE: 1.09

## 2023-02-09 NOTE — PROGRESS NOTES
Incision Check     CC: Incision Check     HPI: 22 y.o.  s/p  delivery on 2023 presents today for routine incision check. Was started on Bactrim last visit for concern for superficial wound infection.  Patient reports doing well.  Denies fevers/chills.  Denies incisional redness/pain or drainage.  Normal lochia. Pain and redness at incision site have resolved.     Denies concerns about mood.     Translation services:  [facility] #[id number]     Vitals:    23 1552   BP: 122/76      Gen: AAO, NAD  Abd: soft, NT, ND,   Incision C/D/I, healing well     A/P: 22 y.o.  s/p LTCS  - no signs of postop complications  - encouraged BFing, lactation visit prn  - no signs of PP depression  - requests nexplanon at postpartum visit; advised to abstain from intercourse.     RTC for routine postpartum at 6wks PP    Sherwin Gil D.O.

## 2023-03-09 ENCOUNTER — GYNECOLOGY VISIT (OUTPATIENT)
Dept: OBGYN | Facility: CLINIC | Age: 23
End: 2023-03-09
Payer: COMMERCIAL

## 2023-03-09 VITALS — WEIGHT: 134 LBS | DIASTOLIC BLOOD PRESSURE: 60 MMHG | BODY MASS INDEX: 25.33 KG/M2 | SYSTOLIC BLOOD PRESSURE: 102 MMHG

## 2023-03-09 DIAGNOSIS — Z30.017 NEXPLANON INSERTION: ICD-10-CM

## 2023-03-09 LAB
POCT INT CON NEG: NEGATIVE
POCT INT CON POS: POSITIVE
POCT URINE PREGNANCY TEST: NEGATIVE

## 2023-03-09 PROCEDURE — 11981 INSERTION DRUG DLVR IMPLANT: CPT | Performed by: STUDENT IN AN ORGANIZED HEALTH CARE EDUCATION/TRAINING PROGRAM

## 2023-03-09 PROCEDURE — 81025 URINE PREGNANCY TEST: CPT | Performed by: STUDENT IN AN ORGANIZED HEALTH CARE EDUCATION/TRAINING PROGRAM

## 2023-03-09 ASSESSMENT — FIBROSIS 4 INDEX: FIB4 SCORE: 1.09

## 2023-03-09 NOTE — PROGRESS NOTES
Pt here for Nexplanon insertion  UPT Negative  Pt states no complaints  [phone]  Pharmacy verified

## 2023-03-09 NOTE — PROCEDURES
Nexplanon Placement Procedure Note    Patient presents for desired long term reversible contraception.  R/B/A discussed with patient, including pain during placement, bleeding, bruising, and irregular uterine bleeding following placement.  Procedure discussed at length with patient, including use of local anesthetic.  She is right hand dominant.  Consent form signed.    Pregnancy was excluded by negative UPT.    She was counseled that she needs back up protection for the next 7 days.    Vitals:    03/09/23 1331   BP: 102/60   Weight: 134 lb       The patient was positioned on the examination table with her non-dominant (left) arm flexed at the elbow and externally rotated so that her wrist was parallel to her ear.  The insertion site was identified at the inner side of the non-dominant upper arm about 8-10cm (3-4 inches) above the medial epicondyle of the humerus and 2 cm below sulcus. The insertion site was cleansed with betadine and 4mL of 1% lidocaine without epinephrine was injected subcutaneously.      The nexplanon device was removed from the package and the protective covering was removed from the device.  The white colored implant was verified in the device.  The skin was punctured with the device at 30 degrees at the previously identified insertion site.  The applicator was then lowered to the horizontal position and the needle was then inserted to its full length.  The slider was then pulled back fully and the implant was released.  Presence of the implant was verified by both the physician and the patient.  Minimal blood loss.  A bandage was placed over the insertion site and kerlex wrap was applied.  Patient tolerated the procedure well.     Nexplanon implant information was collected and is to be scanned into the patient's electronic medical record.    LOT:  [id number]  Exp:  [id number]    Sherwin Gil D.O.

## 2023-04-10 ENCOUNTER — GYNECOLOGY VISIT (OUTPATIENT)
Dept: OBGYN | Facility: CLINIC | Age: 23
End: 2023-04-10
Payer: COMMERCIAL

## 2023-04-10 ENCOUNTER — HOSPITAL ENCOUNTER (OUTPATIENT)
Facility: MEDICAL CENTER | Age: 23
End: 2023-04-10
Attending: STUDENT IN AN ORGANIZED HEALTH CARE EDUCATION/TRAINING PROGRAM
Payer: COMMERCIAL

## 2023-04-10 VITALS — WEIGHT: 130 LBS | BODY MASS INDEX: 24.58 KG/M2 | DIASTOLIC BLOOD PRESSURE: 70 MMHG | SYSTOLIC BLOOD PRESSURE: 110 MMHG

## 2023-04-10 DIAGNOSIS — N89.8 VAGINAL DISCHARGE: ICD-10-CM

## 2023-04-10 PROCEDURE — 87510 GARDNER VAG DNA DIR PROBE: CPT

## 2023-04-10 PROCEDURE — 99213 OFFICE O/P EST LOW 20 MIN: CPT | Performed by: STUDENT IN AN ORGANIZED HEALTH CARE EDUCATION/TRAINING PROGRAM

## 2023-04-10 PROCEDURE — 87660 TRICHOMONAS VAGIN DIR PROBE: CPT

## 2023-04-10 PROCEDURE — 87480 CANDIDA DNA DIR PROBE: CPT

## 2023-04-10 ASSESSMENT — FIBROSIS 4 INDEX: FIB4 SCORE: 1.09

## 2023-04-10 NOTE — PROGRESS NOTES
Pt here to discuss nexplanon check  Pt states no complaints  Good # [phone] (home)    Pharmacy verified.

## 2023-04-10 NOTE — PROGRESS NOTES
GYN FOLLOW UP VISIT    CC:  Vaginal discharge       HPI: Patient is a 22 y.o.  who presents for follow up from nexplanon insertion, mainly with complaint of vaginal discharge today.  Reports bleeding x1 week after insertion.  Is worried the nexplanon will cause ovarian cysts and she won't be able to have babies.  She also reports increased vaginal itching over the last week, which is her main concern today. Reports brown discharge and intense itching. She was prescribed flagyl and terconazole cream during her pregnancy for these symptoms.        ROS:   General: denies fever / chills  HEENT: denies sore throat  CV: denies chest pain  Repiratory: denies shortness of breath  GI: denies abdominal pain  : denies dysuria    PFSH:  I personally reviewed the past medical and surgical histories.       PHYSICAL EXAMINATION:  Vital Signs:   Vitals:    04/10/23 1344   BP: 110/70   BP Location: Right arm   Patient Position: Sitting   Weight: 130 lb     Body mass index is 24.58 kg/m².    Gen: appears well, NAD  Respiratory: normal effort  Abdomen: Soft, non-tender.  Pelvic Exam:    Vulva: normal.    Urethra: normal.   Vagina: normal, small amount of brown discharge   Cervix: normal.    Uterus: normal size, shape and contour.    left adnexa: normal.   right adnexa: normal.   Perineum: normal.    ASSESSMENT AND PLAN:  22 y.o.    1. Vaginal discharge  -- vaginal pathogens collected today  -- pelvic exam benign  -- will call patient once swab results if treatment is indicated  -- discussed irregular bleeding pattern is normal with nexplanon  -- pap up to date (2022)  -- all questions answered    - VAGINAL PATHOGENS DNA PANEL; Future    Return for annual or PRN    Total Time: 25 minutes spent in chart review, exam, counseling and documentation  Sherwin Gil D.O.

## 2023-04-11 DIAGNOSIS — N76.0 BACTERIAL VAGINOSIS: ICD-10-CM

## 2023-04-11 DIAGNOSIS — B96.89 BACTERIAL VAGINOSIS: ICD-10-CM

## 2023-04-11 RX ORDER — METRONIDAZOLE 500 MG/1
TABLET ORAL
Qty: 14 TABLET | Refills: 0 | Status: SHIPPED | OUTPATIENT
Start: 2023-04-11 | End: 2023-05-10 | Stop reason: CLARIF

## 2023-04-12 ENCOUNTER — TELEPHONE (OUTPATIENT)
Dept: OBGYN | Facility: CLINIC | Age: 23
End: 2023-04-12
Payer: COMMERCIAL

## 2023-04-12 NOTE — TELEPHONE ENCOUNTER
----- Message from Sherwin Gil D.O. sent at 4/12/2023  8:13 AM PDT -----  Can you please let patient know that her swab was positive for BV?  I have prescribed her medications to her pharmacy. Thank you!    Vietnamese speaking      Called pt and informed her test came back positive for BV, explained this is not an STI. Pt informed she needs to start antibiotics. Should take the full Tx and advised to take meds with food but not with milk and to avoid alcohol and intercourse while on Tx. Pharmacy verified with pt. Pt agreed and verbalized understanding.     Pt states she was prescribed iron after her delivery x 5 refills, pt states she only took one month, pt asking if she can go and  the rest of the refills. Reviewed pt's last CBC and H/H low advised pt to  continue taking the iron as prescribed. Pt agreed and verbalized understanding.

## 2023-04-28 ENCOUNTER — OFFICE VISIT (OUTPATIENT)
Dept: URGENT CARE | Facility: CLINIC | Age: 23
End: 2023-04-28
Payer: COMMERCIAL

## 2023-04-28 ENCOUNTER — HOSPITAL ENCOUNTER (OUTPATIENT)
Facility: MEDICAL CENTER | Age: 23
End: 2023-04-28
Attending: REGISTERED NURSE
Payer: COMMERCIAL

## 2023-04-28 VITALS
BODY MASS INDEX: 25.23 KG/M2 | SYSTOLIC BLOOD PRESSURE: 118 MMHG | HEIGHT: 62 IN | HEART RATE: 93 BPM | RESPIRATION RATE: 16 BRPM | OXYGEN SATURATION: 97 % | WEIGHT: 137.1 LBS | TEMPERATURE: 98.7 F | DIASTOLIC BLOOD PRESSURE: 64 MMHG

## 2023-04-28 DIAGNOSIS — N89.8 VAGINAL DISCHARGE: ICD-10-CM

## 2023-04-28 LAB
APPEARANCE UR: CLEAR
BILIRUB UR STRIP-MCNC: NEGATIVE MG/DL
COLOR UR AUTO: YELLOW
GLUCOSE UR STRIP.AUTO-MCNC: NEGATIVE MG/DL
KETONES UR STRIP.AUTO-MCNC: NORMAL MG/DL
LEUKOCYTE ESTERASE UR QL STRIP.AUTO: NEGATIVE
NITRITE UR QL STRIP.AUTO: NEGATIVE
PH UR STRIP.AUTO: 5.5 [PH] (ref 5–8)
PROT UR QL STRIP: NEGATIVE MG/DL
RBC UR QL AUTO: NEGATIVE
SP GR UR STRIP.AUTO: 1.03
UROBILINOGEN UR STRIP-MCNC: 0.2 MG/DL

## 2023-04-28 PROCEDURE — 99213 OFFICE O/P EST LOW 20 MIN: CPT | Performed by: REGISTERED NURSE

## 2023-04-28 PROCEDURE — 87491 CHLMYD TRACH DNA AMP PROBE: CPT

## 2023-04-28 PROCEDURE — 87480 CANDIDA DNA DIR PROBE: CPT

## 2023-04-28 PROCEDURE — 87591 N.GONORRHOEAE DNA AMP PROB: CPT

## 2023-04-28 PROCEDURE — 87660 TRICHOMONAS VAGIN DIR PROBE: CPT

## 2023-04-28 PROCEDURE — 87510 GARDNER VAG DNA DIR PROBE: CPT

## 2023-04-28 PROCEDURE — 81002 URINALYSIS NONAUTO W/O SCOPE: CPT | Performed by: REGISTERED NURSE

## 2023-04-28 RX ORDER — METRONIDAZOLE 7.5 MG/G
1 GEL VAGINAL
Qty: 5 EACH | Refills: 0 | Status: SHIPPED | OUTPATIENT
Start: 2023-04-28 | End: 2023-05-03

## 2023-04-28 ASSESSMENT — ENCOUNTER SYMPTOMS
DIZZINESS: 0
CHILLS: 0
FLANK PAIN: 0
SHORTNESS OF BREATH: 0
FEVER: 0
NAUSEA: 0
ABDOMINAL PAIN: 0
VOMITING: 0

## 2023-04-28 ASSESSMENT — FIBROSIS 4 INDEX: FIB4 SCORE: 1.09

## 2023-04-28 NOTE — PROGRESS NOTES
Chief Complaint   Patient presents with    Vaginal Discharge     Vaginal discharge, itchiness, burning x 2 weeks. Was given medication and it didn't seems to help.      HPI:   Miriam Reyes Lopez is a 22 y.o. female who is presenting for continued vaginal discharge that is clear to white, occasional brown. Also has fishy odor. Was recently treated with metronidazole PO for BV, but reports it did not work and all of her symptoms are back.  Does not recall if they tested her for STIs.  She denies pregnancy.    Pertinent negatives: No fever, body aches, chills,     Pertinent history: Recent treatment for BV    Immunizations: Reported up to date    Social History     Tobacco Use    Smoking status: Never    Smokeless tobacco: Never   Vaping Use    Vaping Use: Never used   Substance Use Topics    Alcohol use: Not Currently    Drug use: Never     No Known Allergies    Patient Active Problem List    Diagnosis Date Noted    Gestational hypertension, third trimester 01/24/2023    Labor and delivery, indication for care 01/23/2023    Cystitis 11/28/2022    Elevated 1hr  --> 3hr GTT wnl 11/04/2022    Anemia affecting pregnancy - 9.7/31.9 on 10/4 - stable 11/4 10/04/2022    Supervision of first pregnancy  08/29/2022       Current Outpatient Medications Ordered in Epic   Medication Sig Dispense Refill    metroNIDAZOLE (METROGEL-VAGINAL) 0.75 % Gel Insert 1 Applicator into the vagina at bedtime for 5 days. 5 Each 0    docusate sodium 100 MG Cap Take 1 capsule by mouth 2 times a day as needed for Constipation. 60 Capsule 0    ferrous sulfate 325 (65 Fe) MG tablet Take 1 Tablet by mouth every morning with breakfast. 30 Tablet 5    metroNIDAZOLE (FLAGYL) 500 MG Tab Take 1 pill 2 times daily for 7 days (Patient not taking: Reported on 4/28/2023) 14 Tablet 0    ibuprofen (MOTRIN) 600 MG Tab Take 1 Tablet by mouth every 6 hours as needed for Mild Pain. (Patient not taking: Reported on 4/28/2023) 30 Tablet 1    acetaminophen  (TYLENOL) 325 MG Tab Take 2 Tablets by mouth every 6 hours as needed for Mild Pain. (Patient not taking: Reported on 4/28/2023) 30 Tablet 1    acetaminophen (TYLENOL) 500 MG Tab Take 2 Tablets by mouth every 6 hours. (Patient not taking: Reported on 4/28/2023) 30 Tablet 0    ibuprofen (MOTRIN) 800 MG Tab Take 1 Tablet by mouth every 8 hours as needed for Moderate Pain. (Patient not taking: Reported on 4/28/2023) 30 Tablet 0    Prenatal MV-Min-Fe Fum-FA-DHA (PRENATAL 1 PO) Take  by mouth. (Patient not taking: Reported on 4/28/2023)       No current [facility]-ordered facility-administered medications on file.       Review of Systems   Constitutional:  Negative for chills, fever and malaise/fatigue.   Respiratory:  Negative for shortness of breath.    Cardiovascular:  Negative for chest pain.   Gastrointestinal:  Negative for abdominal pain, nausea and vomiting.   Genitourinary:  Negative for dysuria, flank pain, frequency, hematuria and urgency.        Positive for vaginal discharge and fishy odor   Neurological:  Negative for dizziness.      Vitals:    04/28/23 1346   BP: 118/64   Pulse: 93   Resp: 16   Temp: 37.1 °C (98.7 °F)   SpO2: 97%       Physical Exam  Vitals and nursing note reviewed.   Constitutional:       General: She is not in acute distress.     Appearance: Normal appearance. She is not ill-appearing, toxic-appearing or diaphoretic.   HENT:      Mouth/Throat:      Mouth: Mucous membranes are moist.   Eyes:      Pupils: Pupils are equal, round, and reactive to light.   Cardiovascular:      Rate and Rhythm: Normal rate and regular rhythm.      Heart sounds: Normal heart sounds.   Pulmonary:      Effort: Pulmonary effort is normal. No respiratory distress.      Breath sounds: Normal breath sounds.   Abdominal:      General: Abdomen is flat. There is no distension.      Palpations: Abdomen is soft.      Tenderness: There is no abdominal tenderness. There is no right CVA tenderness, left CVA tenderness, guarding  or rebound.      Comments: No suprapubic tenderness   Musculoskeletal:      Cervical back: Normal range of motion and neck supple.   Skin:     General: Skin is warm and dry.      Capillary Refill: Capillary refill takes less than 2 seconds.   Neurological:      General: No focal deficit present.      Mental Status: She is alert and oriented to person, place, and time. Mental status is at baseline.   Psychiatric:         Mood and Affect: Mood normal.     Assessment/Plan:  1. Vaginal discharge  POCT Urinalysis    VAGINAL PATHOGENS DNA PANEL    Chlamydia/GC, PCR (Urine)    metroNIDAZOLE (METROGEL-VAGINAL) 0.75 % Gel    POCT Pregnancy      Pleasant 22-year-old female, nontoxic appearance, vital signs within normal limits.  Presenting with reemergence of vaginal discharge its mostly clear to white but occasionally brown, also a fishy odor.  Recently treated with p.o. Flagyl reports shortly after those symptoms returned.  Her exam is unremarkable, no abdominal tenderness.  She did perform vaginal self swab, but I will start intravaginal metronidazole empirically.  Also will send urine for GC and chlamydia.  The in office UA was unremarkable other than trace ketones, HCG negative.  Discussed remaining abstinent till results are returned.  Monitor symptoms.  Adequate hydration.    Return to clinic or go to ED if symptoms worsen or persist. Indications for ED discussed at length. Patient/Parent/Guardian voices understanding. Follow-up with your primary care provider in 3-5 days. Red flag symptoms discussed. All side effects of medication discussed including allergic response, GI upset, tendon injury, rash, sedation etc.    I personally reviewed prior external notes and test results pertinent to today's visit as well as additional imaging and testing completed in clinic today.     Please note that this dictation was created using voice recognition software. I have made every reasonable attempt to correct obvious errors, but I  expect that there are errors of grammar and possibly content that I did not discover before finalizing the note.

## 2023-04-29 LAB
C TRACH DNA SPEC QL NAA+PROBE: NEGATIVE
CANDIDA DNA VAG QL PROBE+SIG AMP: NEGATIVE
G VAGINALIS DNA VAG QL PROBE+SIG AMP: POSITIVE
N GONORRHOEA DNA SPEC QL NAA+PROBE: NEGATIVE
SPECIMEN SOURCE: NORMAL
T VAGINALIS DNA VAG QL PROBE+SIG AMP: NEGATIVE

## 2023-05-10 ENCOUNTER — GYNECOLOGY VISIT (OUTPATIENT)
Dept: OBGYN | Facility: CLINIC | Age: 23
End: 2023-05-10
Payer: COMMERCIAL

## 2023-05-10 VITALS — SYSTOLIC BLOOD PRESSURE: 92 MMHG | WEIGHT: 130.6 LBS | BODY MASS INDEX: 23.89 KG/M2 | DIASTOLIC BLOOD PRESSURE: 54 MMHG

## 2023-05-10 DIAGNOSIS — B96.89 BV (BACTERIAL VAGINOSIS): Primary | ICD-10-CM

## 2023-05-10 DIAGNOSIS — Z98.891 STATUS POST C-SECTION: ICD-10-CM

## 2023-05-10 DIAGNOSIS — N76.0 BV (BACTERIAL VAGINOSIS): Primary | ICD-10-CM

## 2023-05-10 DIAGNOSIS — R11.0 NAUSEA: ICD-10-CM

## 2023-05-10 PROCEDURE — 99213 OFFICE O/P EST LOW 20 MIN: CPT | Performed by: OBSTETRICS & GYNECOLOGY

## 2023-05-10 ASSESSMENT — FIBROSIS 4 INDEX: FIB4 SCORE: 1.09

## 2023-05-10 NOTE — PROGRESS NOTES
Subjective     Miriam Reyes Lopez is a 22 y.o. female who presents with Gynecologic Exam (C section pain)    CC:  incision pain and burning, also having occasional nausea    HPI: Melanie is a 21yo  who is status post  on 2023.  About 2 weeks ago, she lifted up something heavy at work.  Since this time, she has noticed that has had some pain on her  incision.  She is also had some burning on the incision.  The  incision seems to be painful to the touch.  She is also had some mild nausea the past couple of weeks.  She has not been around anyone who has been sick.  She did not eat anything that she can think of that made her sick.  She also noticed that her vaginal pathogens testing was positive for BV.  She did not get any notification from the pharmacy that a prescription was already sent for this.      Objective     BP 92/54 (BP Location: Right arm, Patient Position: Sitting, BP Cuff Size: Adult)   Wt 130 lb 9.6 oz   LMP 2023   BMI 23.89 kg/m²      Physical Exam  Constitutional:       Appearance: Normal appearance.   HENT:      Head: Normocephalic and atraumatic.      Mouth/Throat:      Mouth: Mucous membranes are moist.   Eyes:      Extraocular Movements: Extraocular movements intact.   Pulmonary:      Effort: Pulmonary effort is normal. No respiratory distress.   Abdominal:      General: Abdomen is flat. There is no distension.      Palpations: Abdomen is soft.      Tenderness: There is no abdominal tenderness.      Comments:  incision appears to be healing well   Neurological:      Mental Status: She is alert.                             Assessment & Plan     Melanie was seen today for many issues including pain on her , history of bacterial vaginosis (has not picked up prescription), and nausea    Diagnoses and all orders for this visit:    BV (bacterial vaginosis)  -I discussed that metronidazole gel was already sent to her pharmacy.  She  plans to  the prescription.    Status post   -I discussed that she may experience some soreness around her incision, but that the incision looks like it is healing well.  I discussed that she might need to take it easy for a few more weeks and avoid heavy lifting.  All questions answered.    Nausea  -I discussed the nausea is most likely not related to the .  The nausea might be related to her Nexplanon.  If she continues to have nausea intermittently, she should return for further work-up and evaluation.    Total Time: 20 minutes spent in chart review, exam, counseling and documentation    Nita Hargrove M.D.

## 2023-05-18 ENCOUNTER — APPOINTMENT (OUTPATIENT)
Dept: RADIOLOGY | Facility: MEDICAL CENTER | Age: 23
End: 2023-05-18
Attending: EMERGENCY MEDICINE
Payer: COMMERCIAL

## 2023-05-18 ENCOUNTER — HOSPITAL ENCOUNTER (EMERGENCY)
Facility: MEDICAL CENTER | Age: 23
End: 2023-05-18
Attending: EMERGENCY MEDICINE
Payer: COMMERCIAL

## 2023-05-18 VITALS
DIASTOLIC BLOOD PRESSURE: 55 MMHG | RESPIRATION RATE: 18 BRPM | SYSTOLIC BLOOD PRESSURE: 115 MMHG | BODY MASS INDEX: 25.15 KG/M2 | TEMPERATURE: 97.2 F | OXYGEN SATURATION: 98 % | HEIGHT: 62 IN | WEIGHT: 136.69 LBS | HEART RATE: 71 BPM

## 2023-05-18 DIAGNOSIS — D64.9 ANEMIA, UNSPECIFIED TYPE: ICD-10-CM

## 2023-05-18 DIAGNOSIS — R07.9 CHEST PAIN, UNSPECIFIED TYPE: ICD-10-CM

## 2023-05-18 LAB
ALBUMIN SERPL BCP-MCNC: 3.9 G/DL (ref 3.2–4.9)
ALBUMIN/GLOB SERPL: 1.1 G/DL
ALP SERPL-CCNC: 82 U/L (ref 30–99)
ALT SERPL-CCNC: 16 U/L (ref 2–50)
ANION GAP SERPL CALC-SCNC: 11 MMOL/L (ref 7–16)
AST SERPL-CCNC: 18 U/L (ref 12–45)
BASOPHILS # BLD AUTO: 0.4 % (ref 0–1.8)
BASOPHILS # BLD: 0.02 K/UL (ref 0–0.12)
BILIRUB SERPL-MCNC: <0.2 MG/DL (ref 0.1–1.5)
BUN SERPL-MCNC: 10 MG/DL (ref 8–22)
CALCIUM ALBUM COR SERPL-MCNC: 8.7 MG/DL (ref 8.5–10.5)
CALCIUM SERPL-MCNC: 8.6 MG/DL (ref 8.4–10.2)
CHLORIDE SERPL-SCNC: 110 MMOL/L (ref 96–112)
CO2 SERPL-SCNC: 18 MMOL/L (ref 20–33)
CREAT SERPL-MCNC: 0.69 MG/DL (ref 0.5–1.4)
EKG IMPRESSION: NORMAL
EOSINOPHIL # BLD AUTO: 0.11 K/UL (ref 0–0.51)
EOSINOPHIL NFR BLD: 1.9 % (ref 0–6.9)
ERYTHROCYTE [DISTWIDTH] IN BLOOD BY AUTOMATED COUNT: 41.6 FL (ref 35.9–50)
GFR SERPLBLD CREATININE-BSD FMLA CKD-EPI: 124 ML/MIN/1.73 M 2
GLOBULIN SER CALC-MCNC: 3.4 G/DL (ref 1.9–3.5)
GLUCOSE SERPL-MCNC: 86 MG/DL (ref 65–99)
HCT VFR BLD AUTO: 35.3 % (ref 37–47)
HGB BLD-MCNC: 10.8 G/DL (ref 12–16)
IMM GRANULOCYTES # BLD AUTO: 0.02 K/UL (ref 0–0.11)
IMM GRANULOCYTES NFR BLD AUTO: 0.4 % (ref 0–0.9)
LIPASE SERPL-CCNC: 47 U/L (ref 7–58)
LYMPHOCYTES # BLD AUTO: 1.94 K/UL (ref 1–4.8)
LYMPHOCYTES NFR BLD: 34.2 % (ref 22–41)
MCH RBC QN AUTO: 23 PG (ref 27–33)
MCHC RBC AUTO-ENTMCNC: 30.6 G/DL (ref 33.6–35)
MCV RBC AUTO: 75.1 FL (ref 81.4–97.8)
MONOCYTES # BLD AUTO: 0.3 K/UL (ref 0–0.85)
MONOCYTES NFR BLD AUTO: 5.3 % (ref 0–13.4)
NEUTROPHILS # BLD AUTO: 3.29 K/UL (ref 2–7.15)
NEUTROPHILS NFR BLD: 57.8 % (ref 44–72)
NRBC # BLD AUTO: 0 K/UL
NRBC BLD-RTO: 0 /100 WBC
PLATELET # BLD AUTO: 300 K/UL (ref 164–446)
PMV BLD AUTO: 9.8 FL (ref 9–12.9)
POTASSIUM SERPL-SCNC: 4 MMOL/L (ref 3.6–5.5)
PROT SERPL-MCNC: 7.3 G/DL (ref 6–8.2)
RBC # BLD AUTO: 4.7 M/UL (ref 4.2–5.4)
SODIUM SERPL-SCNC: 139 MMOL/L (ref 135–145)
TROPONIN T SERPL-MCNC: <6 NG/L (ref 6–19)
WBC # BLD AUTO: 5.7 K/UL (ref 4.8–10.8)

## 2023-05-18 PROCEDURE — 99284 EMERGENCY DEPT VISIT MOD MDM: CPT

## 2023-05-18 PROCEDURE — 96374 THER/PROPH/DIAG INJ IV PUSH: CPT

## 2023-05-18 PROCEDURE — 700111 HCHG RX REV CODE 636 W/ 250 OVERRIDE (IP): Performed by: EMERGENCY MEDICINE

## 2023-05-18 PROCEDURE — 71045 X-RAY EXAM CHEST 1 VIEW: CPT

## 2023-05-18 PROCEDURE — 93005 ELECTROCARDIOGRAM TRACING: CPT | Performed by: EMERGENCY MEDICINE

## 2023-05-18 PROCEDURE — 85025 COMPLETE CBC W/AUTO DIFF WBC: CPT

## 2023-05-18 PROCEDURE — 76705 ECHO EXAM OF ABDOMEN: CPT

## 2023-05-18 PROCEDURE — 83690 ASSAY OF LIPASE: CPT

## 2023-05-18 PROCEDURE — 700102 HCHG RX REV CODE 250 W/ 637 OVERRIDE(OP): Performed by: EMERGENCY MEDICINE

## 2023-05-18 PROCEDURE — 80053 COMPREHEN METABOLIC PANEL: CPT

## 2023-05-18 PROCEDURE — 36415 COLL VENOUS BLD VENIPUNCTURE: CPT

## 2023-05-18 PROCEDURE — 84484 ASSAY OF TROPONIN QUANT: CPT

## 2023-05-18 PROCEDURE — A9270 NON-COVERED ITEM OR SERVICE: HCPCS | Performed by: EMERGENCY MEDICINE

## 2023-05-18 PROCEDURE — 93005 ELECTROCARDIOGRAM TRACING: CPT

## 2023-05-18 RX ORDER — FAMOTIDINE 20 MG/1
20 TABLET, FILM COATED ORAL ONCE
Status: COMPLETED | OUTPATIENT
Start: 2023-05-18 | End: 2023-05-18

## 2023-05-18 RX ORDER — ETONOGESTREL 68 MG/1
1 IMPLANT SUBCUTANEOUS ONCE
COMMUNITY

## 2023-05-18 RX ORDER — ONDANSETRON 2 MG/ML
4 INJECTION INTRAMUSCULAR; INTRAVENOUS ONCE
Status: COMPLETED | OUTPATIENT
Start: 2023-05-18 | End: 2023-05-18

## 2023-05-18 RX ORDER — ONDANSETRON 4 MG/1
4 TABLET, ORALLY DISINTEGRATING ORAL EVERY 8 HOURS PRN
Qty: 10 TABLET | Refills: 0 | Status: SHIPPED | OUTPATIENT
Start: 2023-05-18

## 2023-05-18 RX ORDER — OMEPRAZOLE 20 MG/1
20 CAPSULE, DELAYED RELEASE ORAL DAILY
Qty: 30 CAPSULE | Refills: 1 | Status: SHIPPED | OUTPATIENT
Start: 2023-05-18

## 2023-05-18 RX ADMIN — FAMOTIDINE 20 MG: 20 TABLET, FILM COATED ORAL at 09:25

## 2023-05-18 RX ADMIN — ONDANSETRON 4 MG: 2 INJECTION INTRAMUSCULAR; INTRAVENOUS at 09:23

## 2023-05-18 RX ADMIN — LIDOCAINE HYDROCHLORIDE 30 ML: 20 SOLUTION OROPHARYNGEAL at 09:25

## 2023-05-18 ASSESSMENT — FIBROSIS 4 INDEX: FIB4 SCORE: 1.09

## 2023-05-18 NOTE — ED NOTES
U/s completed, Pt medicated as directed by ER md, poc update given to pt. Further orders and dispo pending at this time. No further questions from pt at this time

## 2023-05-18 NOTE — ED PROVIDER NOTES
ED Provider Note    CHIEF COMPLAINT  No chief complaint on file.      EXTERNAL RECORDS REVIEWED  Outpatient Notes GYN visit for Nexplanon insertion on 4/10/23    HPI/ROS  LIMITATION TO HISTORY   Select: : None  OUTSIDE HISTORIAN(S):      Miriam Reyes Lopez is a 22 y.o. female who presents for evaluation of chest pain.    Patient reports noting chest pain that was acute in onset while standing at her job yesterday at 8 AM.  This lasted for 2 hours and then resolved.  She did have associated chills and nausea.  The pain recurred at night and was constant while she was lying down.  This morning she continued to note pain and decided to come to the ER for evaluation.  Patient describes this pain as pressure-like, as if she has swallowed candy and something is stuck.  She states this pain increases with deep inspiration.  It is nonradiating.  She has never had pain like this before.  Patient states she had a white coating on her lips yesterday.    Patient denies vomiting, diarrhea, shortness of breath, diaphoresis, back pain, trauma, heavy lifting, calf pain, leg swelling, hemoptysis, fever.    PAST MEDICAL HISTORY     Anemia    SURGICAL HISTORY   has a past surgical history that includes primary c section (N/A, 1/24/2023).    FAMILY HISTORY  Family History   Problem Relation Age of Onset    No Known Problems Mother     No Known Problems Father     No Known Problems Sister     No Known Problems Brother     No Known Problems Brother        SOCIAL HISTORY  Social History     Tobacco Use    Smoking status: Never    Smokeless tobacco: Never   Vaping Use    Vaping Use: Never used   Substance and Sexual Activity    Alcohol use: Not Currently    Drug use: Never    Sexual activity: Yes     Partners: Male     Birth control/protection: None, Implant       CURRENT MEDICATIONS  Home Medications       Reviewed by Jacinta Verma (Pharmacy Tech) on 05/18/23 at 0841  Med List Status: Complete     Medication  "Last Dose Status   etonogestrel (NEXPLANON) 68 MG Implant implant 1 or 2 months ago Active   NON SPECIFIED 5/16/2023 Active                    ALLERGIES  No Known Allergies    PHYSICAL EXAM  VITAL SIGNS: /55   Pulse 71   Temp 36.2 °C (97.2 °F)   Resp 18   Ht 1.575 m (5' 2\")   Wt 62 kg (136 lb 11 oz)   SpO2 98%   BMI 25.00 kg/m²    General:  WDWN female, nontoxic appearing in NAD; A+Ox3; V/S as above; not tachycardic or hypoxic  Skin: warm and dry; good color; no rash  HEENT: NCAT; EOMs intact; PERRL; no scleral icterus   Neck: FROM; no stridor  Cardiovascular: Regular heart rate and rhythm.  No murmurs, rubs, or gallops; pulses 2+ bilaterally radially  Chest: Minimal tenderness on palpation of the sternal area; no crepitus  Lungs: No respiratory distress or tachypnea; Clear to auscultation with good air movement bilaterally.  No wheezes, rhonchi, or rales.   Abdomen: BS present; soft; NTND; no rebound, guarding, or rigidity.  No organomegaly or pulsatile mass; no CVAT   Extremities: STERLING x 4; no e/o trauma; no pedal edema; neg Bhaskar's  Neurologic: CNs III-XII grossly intact; speech clear; distal sensation intact; strength 5/5 UE/LEs  Psychiatric: Appropriate affect, normal mood      DIAGNOSTIC STUDIES / PROCEDURES  EKG  I have independently interpreted this EKG. no acute ST changes.  See below for further interpretation.    LABS  Results for orders placed or performed during the hospital encounter of 05/18/23   CBC WITH DIFFERENTIAL   Result Value Ref Range    WBC 5.7 4.8 - 10.8 K/uL    RBC 4.70 4.20 - 5.40 M/uL    Hemoglobin 10.8 (L) 12.0 - 16.0 g/dL    Hematocrit 35.3 (L) 37.0 - 47.0 %    MCV 75.1 (L) 81.4 - 97.8 fL    MCH 23.0 (L) 27.0 - 33.0 pg    MCHC 30.6 (L) 33.6 - 35.0 g/dL    RDW 41.6 35.9 - 50.0 fL    Platelet Count 300 164 - 446 K/uL    MPV 9.8 9.0 - 12.9 fL    Neutrophils-Polys 57.80 44.00 - 72.00 %    Lymphocytes 34.20 22.00 - 41.00 %    Monocytes 5.30 0.00 - 13.40 %    Eosinophils 1.90 0.00 " - 6.90 %    Basophils 0.40 0.00 - 1.80 %    Immature Granulocytes 0.40 0.00 - 0.90 %    Nucleated RBC 0.00 /100 WBC    Neutrophils (Absolute) 3.29 2.00 - 7.15 K/uL    Lymphs (Absolute) 1.94 1.00 - 4.80 K/uL    Monos (Absolute) 0.30 0.00 - 0.85 K/uL    Eos (Absolute) 0.11 0.00 - 0.51 K/uL    Baso (Absolute) 0.02 0.00 - 0.12 K/uL    Immature Granulocytes (abs) 0.02 0.00 - 0.11 K/uL    NRBC (Absolute) 0.00 K/uL   CMP   Result Value Ref Range    Sodium 139 135 - 145 mmol/L    Potassium 4.0 3.6 - 5.5 mmol/L    Chloride 110 96 - 112 mmol/L    Co2 18 (L) 20 - 33 mmol/L    Anion Gap 11.0 7.0 - 16.0    Glucose 86 65 - 99 mg/dL    Bun 10 8 - 22 mg/dL    Creatinine 0.69 0.50 - 1.40 mg/dL    Calcium 8.6 8.4 - 10.2 mg/dL    AST(SGOT) 18 12 - 45 U/L    ALT(SGPT) 16 2 - 50 U/L    Alkaline Phosphatase 82 30 - 99 U/L    Total Bilirubin <0.2 0.1 - 1.5 mg/dL    Albumin 3.9 3.2 - 4.9 g/dL    Total Protein 7.3 6.0 - 8.2 g/dL    Globulin 3.4 1.9 - 3.5 g/dL    A-G Ratio 1.1 g/dL   LIPASE   Result Value Ref Range    Lipase 47 7 - 58 U/L   TROPONIN   Result Value Ref Range    Troponin T <6 6 - 19 ng/L   CORRECTED CALCIUM   Result Value Ref Range    Correct Calcium 8.7 8.5 - 10.5 mg/dL   ESTIMATED GFR   Result Value Ref Range    GFR (CKD-EPI) 124 >60 mL/min/1.73 m 2   EKG   Result Value Ref Range    Report       [facility] Emergency Dept.    Test Date:  2023  Pt Name:    MIRIAM REYES LOPEZ           Department: [facility]  MRN:        [id number]                      Room:  Gender:     Female                       Technician: emmanuel  :        [id number]                   Requested By:ER TRIAGE PROTOCOL  Order #:    [id number]                    Reading MD: LORIE RECINOS MD    Measurements  Intervals                                Axis  Rate:       78                           P:          90  MA:         167                          QRS:        84  QRSD:       90                           T:          62  QT:          418  QTc:        477    Interpretive Statements  Sinus rhythm  Borderline prolonged QT interval  No previous ECG available for comparison  Electronically Signed On 5- 8:27:18 PDT by LORIE RECINOS MD           RADIOLOGY  I have independently interpreted the diagnostic imaging associated with this visit and am waiting the final reading from the radiologist.   My preliminary interpretation is as follows: No acute pathology  Radiologist interpretation:   US-RUQ   Final Result      No evidence of gallstone or evidence of biliary ductal dilatation.      DX-CHEST-PORTABLE (1 VIEW)   Final Result      No evidence of acute cardiopulmonary process.            COURSE & MEDICAL DECISION MAKING    ED Observation Status? Yes; I am placing the patient in to an observation status due to a diagnostic uncertainty as well as therapeutic intensity. Patient placed in observation status at 8:51 AM, 5/18/2023.     Observation plan is as follows: Labs, nausea and pain control, imaging    Upon Reevaluation, the patient's condition has: Improved; and will be discharged.    Patient discharged from ED Observation status at 1040 AM (Time) 5/18/23 (Date).     INITIAL ASSESSMENT, COURSE AND PLAN  Care Narrative: This is a 22-year-old female who presents for chest pain.  I doubt ACS or TAD given lack of risk factors and quality of symptoms.  I did consider PE but she ruled out for this via PERC criteria.  More likely it is an esophageal issue such as spasm secondary to acid reflux.  I also considered biliary colic, gastritis, pancreatitis.  I ordered labs, chest x-ray, and right upper quadrant ultrasound to look for gallbladder issue.  Zofran, Pepcid, and a GI cocktail were administered.    On reevaluation, the patient felt improved after GI cocktail and Pepcid.  Patient will be discharged with Prilosec for possible acid issue.  Zofran as needed for nausea.  Return precautions were discussed.  ECP referral placed.    FINAL DIAGNOSIS  1.  Chest pain, unspecified type    2. Anemia, unspecified type      Electronically signed by: Dorothy Ashraf M.D., 5/18/2023 8:27 AM

## 2023-05-18 NOTE — ED NOTES
1000 All results back, chart up for MD for re evaluation. poc update given to pt. No further questions at this time. Further orders and dispo pending   Pt states good relief after medication

## 2023-05-18 NOTE — DISCHARGE INSTRUCTIONS
Take Prilosec daily as prescribed for possible acid issue.    Take Zofran as needed for nausea    Return to the ER for difficulty breathing, increasing pain, vomiting, or other concerns    See a regular doctor for follow-up as soon as possible

## 2023-05-23 ENCOUNTER — TELEPHONE (OUTPATIENT)
Dept: HEALTH INFORMATION MANAGEMENT | Facility: OTHER | Age: 23
End: 2023-05-23
Payer: COMMERCIAL

## 2024-08-24 ENCOUNTER — HOSPITAL ENCOUNTER (EMERGENCY)
Facility: MEDICAL CENTER | Age: 24
End: 2024-08-24
Attending: EMERGENCY MEDICINE
Payer: COMMERCIAL

## 2024-08-24 ENCOUNTER — APPOINTMENT (OUTPATIENT)
Dept: RADIOLOGY | Facility: MEDICAL CENTER | Age: 24
End: 2024-08-24
Attending: EMERGENCY MEDICINE
Payer: COMMERCIAL

## 2024-08-24 VITALS
TEMPERATURE: 97.6 F | OXYGEN SATURATION: 98 % | SYSTOLIC BLOOD PRESSURE: 116 MMHG | RESPIRATION RATE: 14 BRPM | HEART RATE: 88 BPM | DIASTOLIC BLOOD PRESSURE: 60 MMHG

## 2024-08-24 DIAGNOSIS — E04.1 THYROID NODULE: ICD-10-CM

## 2024-08-24 DIAGNOSIS — M94.0 COSTOCHONDRITIS: ICD-10-CM

## 2024-08-24 LAB
ALBUMIN SERPL BCP-MCNC: 4.1 G/DL (ref 3.2–4.9)
ALBUMIN/GLOB SERPL: 1.1 G/DL
ALP SERPL-CCNC: 83 U/L (ref 30–99)
ALT SERPL-CCNC: 7 U/L (ref 2–50)
ANION GAP SERPL CALC-SCNC: 14 MMOL/L (ref 7–16)
AST SERPL-CCNC: 12 U/L (ref 12–45)
BASOPHILS # BLD AUTO: 0.5 % (ref 0–1.8)
BASOPHILS # BLD: 0.04 K/UL (ref 0–0.12)
BILIRUB SERPL-MCNC: 0.3 MG/DL (ref 0.1–1.5)
BUN SERPL-MCNC: 9 MG/DL (ref 8–22)
CALCIUM ALBUM COR SERPL-MCNC: 8.7 MG/DL (ref 8.5–10.5)
CALCIUM SERPL-MCNC: 8.8 MG/DL (ref 8.4–10.2)
CHLORIDE SERPL-SCNC: 104 MMOL/L (ref 96–112)
CO2 SERPL-SCNC: 20 MMOL/L (ref 20–33)
CREAT SERPL-MCNC: 0.62 MG/DL (ref 0.5–1.4)
EKG IMPRESSION: NORMAL
EOSINOPHIL # BLD AUTO: 0.16 K/UL (ref 0–0.51)
EOSINOPHIL NFR BLD: 2.2 % (ref 0–6.9)
ERYTHROCYTE [DISTWIDTH] IN BLOOD BY AUTOMATED COUNT: 40.5 FL (ref 35.9–50)
GFR SERPLBLD CREATININE-BSD FMLA CKD-EPI: 126 ML/MIN/1.73 M 2
GLOBULIN SER CALC-MCNC: 3.6 G/DL (ref 1.9–3.5)
GLUCOSE SERPL-MCNC: 88 MG/DL (ref 65–99)
HCG SERPL QL: NEGATIVE
HCT VFR BLD AUTO: 37.2 % (ref 37–47)
HGB BLD-MCNC: 11.7 G/DL (ref 12–16)
IMM GRANULOCYTES # BLD AUTO: 0.01 K/UL (ref 0–0.11)
IMM GRANULOCYTES NFR BLD AUTO: 0.1 % (ref 0–0.9)
LYMPHOCYTES # BLD AUTO: 2.24 K/UL (ref 1–4.8)
LYMPHOCYTES NFR BLD: 30.3 % (ref 22–41)
MCH RBC QN AUTO: 23.9 PG (ref 27–33)
MCHC RBC AUTO-ENTMCNC: 31.5 G/DL (ref 32.2–35.5)
MCV RBC AUTO: 75.9 FL (ref 81.4–97.8)
MONOCYTES # BLD AUTO: 0.35 K/UL (ref 0–0.85)
MONOCYTES NFR BLD AUTO: 4.7 % (ref 0–13.4)
NEUTROPHILS # BLD AUTO: 4.59 K/UL (ref 1.82–7.42)
NEUTROPHILS NFR BLD: 62.2 % (ref 44–72)
NRBC # BLD AUTO: 0 K/UL
NRBC BLD-RTO: 0 /100 WBC (ref 0–0.2)
PLATELET # BLD AUTO: 334 K/UL (ref 164–446)
PMV BLD AUTO: 9.7 FL (ref 9–12.9)
POTASSIUM SERPL-SCNC: 3.9 MMOL/L (ref 3.6–5.5)
PROT SERPL-MCNC: 7.7 G/DL (ref 6–8.2)
RBC # BLD AUTO: 4.9 M/UL (ref 4.2–5.4)
SODIUM SERPL-SCNC: 138 MMOL/L (ref 135–145)
TROPONIN T SERPL-MCNC: <6 NG/L (ref 6–19)
WBC # BLD AUTO: 7.4 K/UL (ref 4.8–10.8)

## 2024-08-24 PROCEDURE — 700102 HCHG RX REV CODE 250 W/ 637 OVERRIDE(OP): Performed by: EMERGENCY MEDICINE

## 2024-08-24 PROCEDURE — 93005 ELECTROCARDIOGRAM TRACING: CPT | Performed by: EMERGENCY MEDICINE

## 2024-08-24 PROCEDURE — 80053 COMPREHEN METABOLIC PANEL: CPT

## 2024-08-24 PROCEDURE — 84484 ASSAY OF TROPONIN QUANT: CPT

## 2024-08-24 PROCEDURE — A9270 NON-COVERED ITEM OR SERVICE: HCPCS | Performed by: EMERGENCY MEDICINE

## 2024-08-24 PROCEDURE — 96374 THER/PROPH/DIAG INJ IV PUSH: CPT | Mod: XU

## 2024-08-24 PROCEDURE — 84703 CHORIONIC GONADOTROPIN ASSAY: CPT

## 2024-08-24 PROCEDURE — 700117 HCHG RX CONTRAST REV CODE 255: Performed by: EMERGENCY MEDICINE

## 2024-08-24 PROCEDURE — 700111 HCHG RX REV CODE 636 W/ 250 OVERRIDE (IP): Mod: JZ | Performed by: EMERGENCY MEDICINE

## 2024-08-24 PROCEDURE — 99284 EMERGENCY DEPT VISIT MOD MDM: CPT

## 2024-08-24 PROCEDURE — 36415 COLL VENOUS BLD VENIPUNCTURE: CPT

## 2024-08-24 PROCEDURE — 85025 COMPLETE CBC W/AUTO DIFF WBC: CPT

## 2024-08-24 PROCEDURE — 71275 CT ANGIOGRAPHY CHEST: CPT

## 2024-08-24 RX ORDER — IBUPROFEN 600 MG/1
600 TABLET, FILM COATED ORAL EVERY 6 HOURS PRN
Qty: 20 TABLET | Refills: 0 | Status: SHIPPED | OUTPATIENT
Start: 2024-08-24

## 2024-08-24 RX ORDER — KETOROLAC TROMETHAMINE 15 MG/ML
15 INJECTION, SOLUTION INTRAMUSCULAR; INTRAVENOUS ONCE
Status: COMPLETED | OUTPATIENT
Start: 2024-08-24 | End: 2024-08-24

## 2024-08-24 RX ADMIN — IOHEXOL 80 ML: 350 INJECTION, SOLUTION INTRAVENOUS at 16:14

## 2024-08-24 RX ADMIN — KETOROLAC TROMETHAMINE 15 MG: 15 INJECTION, SOLUTION INTRAMUSCULAR; INTRAVENOUS at 16:55

## 2024-08-24 RX ADMIN — LIDOCAINE HYDROCHLORIDE 30 ML: 20 SOLUTION ORAL; TOPICAL at 16:55

## 2024-08-24 ASSESSMENT — HEART SCORE
TROPONIN: LESS THAN OR EQUAL TO NORMAL LIMIT
ECG: NORMAL
AGE: <45
HEART SCORE: 0
HISTORY: SLIGHTLY SUSPICIOUS
RISK FACTORS: NO KNOWN RISK FACTORS

## 2024-08-24 NOTE — ED PROVIDER NOTES
ED Provider Note    CHIEF COMPLAINT  Chief Complaint   Patient presents with    Chest Pain    Shortness of Breath       EXTERNAL RECORDS REVIEWED  PDMP no evidence of active prescriptions for sedatives or narcotics    HPI/ROS  LIMITATION TO HISTORY   Select: Language Persian,  Used   OUTSIDE HISTORIAN(S):  Significant other who is at bedside for discussion history and symptoms and presence of     Miriam Reyes Lopez is a 24 y.o. female who presents complaining of 3 weeks intermittent and then later constant sternal chest pain.  She describes pain in the length of her sternum, worse with movement or deep breath.  Patient uses oral contraception, she denies personal or family history of DVT or pulmonary embolism.  No cough, no fever.  She denies trauma or new activity which would explain the pain.  She had similar pain, less severe and more intermittent approximately a year ago which came and went over the course of the week.  No abdominal pain.  She denies pregnancy.  No sore throat, rhinorrhea, or other viral symptoms.    PAST MEDICAL HISTORY       SURGICAL HISTORY   has a past surgical history that includes primary c section (N/A, 1/24/2023).    FAMILY HISTORY  Family History   Problem Relation Age of Onset    No Known Problems Mother     No Known Problems Father     No Known Problems Sister     No Known Problems Brother     No Known Problems Brother        SOCIAL HISTORY  Social History     Tobacco Use    Smoking status: Never    Smokeless tobacco: Never   Vaping Use    Vaping status: Never Used   Substance and Sexual Activity    Alcohol use: Not Currently    Drug use: Never    Sexual activity: Yes     Partners: Male     Birth control/protection: None, Implant       CURRENT MEDICATIONS  Home Medications    **Home medications have not yet been reviewed for this encounter**         ALLERGIES  No Known Allergies    PHYSICAL EXAM  VITAL SIGNS: /78   Pulse 82   Temp 36.4 °C (97.6  °F) (Temporal)   Resp 18   SpO2 97%    Constitutional: Well-nourished in no acute distress  Cardiac: Regular rate, regular rhythm  Respiratory: Clear lung sounds with good air movement.  GI: Abdomen is soft, no distention, no tenderness, no palpable mass  Musculoskeletal: Sternal tenderness, no crepitance or deformity.  Ribs are nontender.  Neurologic: Speech clear, strength intact  Skin: No bruising, no jaundice    EKG/LABS  Results for orders placed or performed during the hospital encounter of 08/24/24   CBC WITH DIFFERENTIAL   Result Value Ref Range    WBC 7.4 4.8 - 10.8 K/uL    RBC 4.90 4.20 - 5.40 M/uL    Hemoglobin 11.7 (L) 12.0 - 16.0 g/dL    Hematocrit 37.2 37.0 - 47.0 %    MCV 75.9 (L) 81.4 - 97.8 fL    MCH 23.9 (L) 27.0 - 33.0 pg    MCHC 31.5 (L) 32.2 - 35.5 g/dL    RDW 40.5 35.9 - 50.0 fL    Platelet Count 334 164 - 446 K/uL    MPV 9.7 9.0 - 12.9 fL    Neutrophils-Polys 62.20 44.00 - 72.00 %    Lymphocytes 30.30 22.00 - 41.00 %    Monocytes 4.70 0.00 - 13.40 %    Eosinophils 2.20 0.00 - 6.90 %    Basophils 0.50 0.00 - 1.80 %    Immature Granulocytes 0.10 0.00 - 0.90 %    Nucleated RBC 0.00 0.00 - 0.20 /100 WBC    Neutrophils (Absolute) 4.59 1.82 - 7.42 K/uL    Lymphs (Absolute) 2.24 1.00 - 4.80 K/uL    Monos (Absolute) 0.35 0.00 - 0.85 K/uL    Eos (Absolute) 0.16 0.00 - 0.51 K/uL    Baso (Absolute) 0.04 0.00 - 0.12 K/uL    Immature Granulocytes (abs) 0.01 0.00 - 0.11 K/uL    NRBC (Absolute) 0.00 K/uL   COMP METABOLIC PANEL   Result Value Ref Range    Sodium 138 135 - 145 mmol/L    Potassium 3.9 3.6 - 5.5 mmol/L    Chloride 104 96 - 112 mmol/L    Co2 20 20 - 33 mmol/L    Anion Gap 14.0 7.0 - 16.0    Glucose 88 65 - 99 mg/dL    Bun 9 8 - 22 mg/dL    Creatinine 0.62 0.50 - 1.40 mg/dL    Calcium 8.8 8.4 - 10.2 mg/dL    Correct Calcium 8.7 8.5 - 10.5 mg/dL    AST(SGOT) 12 12 - 45 U/L    ALT(SGPT) 7 2 - 50 U/L    Alkaline Phosphatase 83 30 - 99 U/L    Total Bilirubin 0.3 0.1 - 1.5 mg/dL    Albumin 4.1 3.2 -  4.9 g/dL    Total Protein 7.7 6.0 - 8.2 g/dL    Globulin 3.6 (H) 1.9 - 3.5 g/dL    A-G Ratio 1.1 g/dL   TROPONIN   Result Value Ref Range    Troponin T <6 6 - 19 ng/L   HCG QUAL SERUM   Result Value Ref Range    Beta-Hcg Qualitative Serum Negative Negative   ESTIMATED GFR   Result Value Ref Range    GFR (CKD-EPI) 126 >60 mL/min/1.73 m 2   EKG   Result Value Ref Range    Report       [facility] Emergency Dept.    Test Date:  2024  Pt Name:    MIRIAM REYES LOPEZ           Department: [facility]  MRN:        [id number]                      Room:       -ROOM 7  Gender:     Female                       Technician: [id number]  :        [id number]                   Requested By:ER TRIAGE PROTOCOL  Order #:    [id number]                    Reading MD:    Measurements  Intervals                                Axis  Rate:       87                           P:          87  TX:         145                          QRS:        92  QRSD:       81                           T:          78  QT:         392  QTc:        472    Interpretive Statements  Sinus rhythm  Borderline right axis deviation  Baseline wander in lead(s) II,III,aVL,aVF,V2  Compared to ECG 2023 08:12:19  No significant changes         I have independently interpreted this EKG    RADIOLOGY/PROCEDURES   I have independently interpreted the diagnostic imaging associated with this visit and am waiting the final reading from the radiologist.   My preliminary interpretation is as follows: CT scan chest with contrast, no evidence of pulmonary embolism    Radiologist interpretation:  CT-CTA CHEST PULMONARY ARTERY W/ RECONS   Final Result         1. No CT evidence of pulmonary embolism.      2. No airspace opacity. No pleural effusion. No pneumothorax.      3. There is a 2.3 cm nodule in the left thyroid gland. Correlate with thyroid ultrasound on nonemergent basis          COURSE & MEDICAL DECISION MAKING    ASSESSMENT, COURSE AND PLAN  Care  Narrative: Patient presents with 3 weeks of chest wall pain, reproducible on physical exam.  She also complains of pain with deep breath as well as with movement.  Patient is high risk for pulmonary embolism, takes oral contraceptive medication.  CT scan of the chest therefore obtained, negative for pulmonary embolism or other acute findings.  There is a 2.3 cm nodule of her left thyroid gland noted.  Patient has been referred to primary care at the [facility] to establish primary care physician and subsequent follow-up for ultrasound of her thyroid as recommended by radiology.  This information was given to the patient both the findings as well as radiologist recommendation for follow-up ultrasound via  line.  Patient's pain is reproducible on exam, suggestive of costochondritis.  EKG is negative, no evidence of pericarditis or ischemia.  Her troponin is negative.  Patient is treated with IV Toradol for pain control.  She also received GI cocktail.  Esophagitis seems unlikely given the nature of her symptoms and reproducibility of her pain on exam.  Patient will be placed on Prilosec during her course of NSAIDs.  She is advised to return to the emergency department for worsening of symptoms or any other concerns.    CHEST PAIN:   HEART Score for Major Cardiac Events  HEART Score     History: Slightly suspicious  ECG: Normal  Age: <45  Risk Factors: No known risk factors  Troponin: Less than or equal to normal limit    Heart Score: 0    Total Score   0-3 Points = Low Score, risk of MACE 0.9-1.7%.  4-6 Points = Moderate Score, risk of MACE 12-16.6%  7-10 Points = High Score, risk of MACE 50-65%            DISPOSITION AND DISCUSSIONS    Escalation of care considered, and ultimately not performed:acute inpatient care management, however at this time, the patient is most appropriate for outpatient management    Barriers to care at this time, including but not limited to: Patient does  not have established PCP.     Decision tools and prescription drugs considered including, but not limited to: Antibiotics were not indicated, no evidence of pneumonia as a cause of her chest pain .    FINAL DIAGNOSIS  1. Costochondritis    2. Thyroid nodule         Electronically signed by: Jared Gary M.D., 8/24/2024 3:21 PM

## 2024-08-24 NOTE — ED TRIAGE NOTES
Pt ambulates to triage with c/o substernal CP that started 3 weeks ago. She reports increased SOB and increased pain with movement.

## 2024-08-24 NOTE — DISCHARGE INSTRUCTIONS
Si no esta mejor en 3 mancia, dar un doctor.  Si esta peor, regressa    Please obtain a primary care doctor, for follow-up care.  You have a 2.3 cm nodule in your left thyroid gland, this will require follow-up thyroid ultrasound that can be obtained through primary care physician.

## (undated) DEVICE — STAPLER SKIN DISP - (6/BX 10BX/CA) VISISTAT

## (undated) DEVICE — PACK ROOM TURNOVER L&D (12/CA)

## (undated) DEVICE — TUBING CLEARLINK DUO-VENT - C-FLO (48EA/CA)

## (undated) DEVICE — CANISTER SUCTION 3000ML MECHANICAL FILTER AUTO SHUTOFF MEDI-VAC NONSTERILE LF DISP  (40EA/CA)

## (undated) DEVICE — HEAD HOLDER JUNIOR/ADULT

## (undated) DEVICE — DRESSING INTERCEED ABSORBABLE ADHESION BARRIER TC7 (10EA/CA)

## (undated) DEVICE — SLEEVE, SEQUENTIAL CALF REG

## (undated) DEVICE — SET EXTENSION WITH 2 PORTS (48EA/CA) ***PART #2C8610 IS A SUBSTITUTE*****

## (undated) DEVICE — CATHETER IV NON-SAFETY 18 GA X 1 1/4 (50/BX 4BX/CA)

## (undated) DEVICE — TAPE CLOTH MEDIPORE 6 INCH - (12RL/CA)

## (undated) DEVICE — CHLORAPREP 26 ML APPLICATOR - ORANGE TINT(25/CA)

## (undated) DEVICE — GLOVE BIOGEL SZ 8 SURGICAL PF LTX - (50PR/BX 4BX/CA)

## (undated) DEVICE — WATER IRRIGATION STERILE 1000ML (12EA/CA)

## (undated) DEVICE — SUTURE 0 VICRYL PLUS CT-1 - 36 INCH (36/BX)

## (undated) DEVICE — SODIUM CHL IRRIGATION 0.9% 1000ML (12EA/CA)

## (undated) DEVICE — TRAY SPINAL ANESTHESIA NON-SAFETY (10/CA)

## (undated) DEVICE — PACK C-SECTION (2EA/CA)

## (undated) DEVICE — SUTURE 2-0 CHROMIC GUT CT-1 27 (36PK/BX)"

## (undated) DEVICE — KIT  I.V. START (100EA/CA)

## (undated) DEVICE — PENCIL ELECTSURG 10FT HLSTR - WITH BLADE (50EA/CA)

## (undated) DEVICE — SUTURE 0 VICRYL PLUS CT 36 (36PK/BX)"

## (undated) DEVICE — SUTURE 3-0 VICRYL PLUS CT-1 - 36 INCH (36/BX)

## (undated) DEVICE — BLANKET UNDERBODY FULL ACCES - (5/CA)

## (undated) DEVICE — LACTATED RINGERS INJ 1000 ML - (14EA/CA 60CA/PF)

## (undated) DEVICE — SUTURE 1 CHROMIC CTX ETHICON - (36PK/BX)